# Patient Record
Sex: FEMALE | Race: WHITE | NOT HISPANIC OR LATINO | Employment: FULL TIME
[De-identification: names, ages, dates, MRNs, and addresses within clinical notes are randomized per-mention and may not be internally consistent; named-entity substitution may affect disease eponyms.]

---

## 2018-04-08 ENCOUNTER — HEALTH MAINTENANCE LETTER (OUTPATIENT)
Age: 23
End: 2018-04-08

## 2020-03-02 ENCOUNTER — HEALTH MAINTENANCE LETTER (OUTPATIENT)
Age: 25
End: 2020-03-02

## 2020-12-20 ENCOUNTER — HEALTH MAINTENANCE LETTER (OUTPATIENT)
Age: 25
End: 2020-12-20

## 2020-12-31 LAB
ABO (EXTERNAL): NORMAL
ABO (EXTERNAL): NORMAL
HEMATOCRIT (EXTERNAL): 36.5 % (ref 35–45)
HEMOGLOBIN (EXTERNAL): 12.5 G/DL (ref 11.7–15.5)
HEPATITIS B SURFACE ANTIGEN (EXTERNAL): NONREACTIVE
HIV1+2 AB SERPL QL IA: NONREACTIVE
PLATELET COUNT (EXTERNAL): 221 10E3/UL (ref 140–400)
RH (EXTERNAL): POSITIVE
RH (EXTERNAL): POSITIVE
RUBELLA ANTIBODY IGG (EXTERNAL): NONREACTIVE
RUBELLA ANTIBODY IGG (EXTERNAL): POSITIVE
VDRL (SYPHILIS) (EXTERNAL): NONREACTIVE

## 2021-04-18 ENCOUNTER — HEALTH MAINTENANCE LETTER (OUTPATIENT)
Age: 26
End: 2021-04-18

## 2021-06-22 ENCOUNTER — HOSPITAL ENCOUNTER (OUTPATIENT)
Facility: CLINIC | Age: 26
End: 2021-06-22
Payer: COMMERCIAL

## 2021-07-18 LAB — GROUP B STREPTOCOCCUS (EXTERNAL): NEGATIVE

## 2021-08-16 ENCOUNTER — HOSPITAL ENCOUNTER (INPATIENT)
Facility: HOSPITAL | Age: 26
LOS: 4 days | Discharge: HOME | End: 2021-08-20
Attending: ADVANCED PRACTICE MIDWIFE | Admitting: ADVANCED PRACTICE MIDWIFE
Payer: COMMERCIAL

## 2021-08-16 LAB
CYCLE THRESHOLD (CT): NORMAL
Lab: NORMAL
SARS-COV-2 RNA RESP QL NAA+PROBE: NEGATIVE

## 2021-08-16 PROCEDURE — 250N000013 HC RX MED GY IP 250 OP 250 PS 637: Performed by: ADVANCED PRACTICE MIDWIFE

## 2021-08-16 PROCEDURE — 87635 SARS-COV-2 COVID-19 AMP PRB: CPT | Performed by: ADVANCED PRACTICE MIDWIFE

## 2021-08-16 PROCEDURE — 120N000001 HC R&B MED SURG/OB

## 2021-08-16 RX ORDER — METOCLOPRAMIDE HYDROCHLORIDE 5 MG/ML
10 INJECTION INTRAMUSCULAR; INTRAVENOUS EVERY 6 HOURS PRN
Status: DISCONTINUED | OUTPATIENT
Start: 2021-08-16 | End: 2021-08-18 | Stop reason: HOSPADM

## 2021-08-16 RX ORDER — OXYTOCIN/0.9 % SODIUM CHLORIDE 30/500 ML
100-340 PLASTIC BAG, INJECTION (ML) INTRAVENOUS CONTINUOUS PRN
Status: DISCONTINUED | OUTPATIENT
Start: 2021-08-16 | End: 2021-08-20 | Stop reason: HOSPADM

## 2021-08-16 RX ORDER — MORPHINE SULFATE 10 MG/ML
10 INJECTION, SOLUTION INTRAMUSCULAR; INTRAVENOUS
Status: DISCONTINUED | OUTPATIENT
Start: 2021-08-16 | End: 2021-08-17

## 2021-08-16 RX ORDER — FENTANYL CITRATE 50 UG/ML
50-100 INJECTION, SOLUTION INTRAMUSCULAR; INTRAVENOUS
Status: DISCONTINUED | OUTPATIENT
Start: 2021-08-16 | End: 2021-08-18 | Stop reason: HOSPADM

## 2021-08-16 RX ORDER — CALCIUM CARBONATE 500 MG/1
1000 TABLET, CHEWABLE ORAL EVERY 4 HOURS PRN
Status: DISCONTINUED | OUTPATIENT
Start: 2021-08-16 | End: 2021-08-20 | Stop reason: HOSPADM

## 2021-08-16 RX ORDER — KETOROLAC TROMETHAMINE 30 MG/ML
30 INJECTION, SOLUTION INTRAMUSCULAR; INTRAVENOUS
Status: DISCONTINUED | OUTPATIENT
Start: 2021-08-16 | End: 2021-08-20 | Stop reason: HOSPADM

## 2021-08-16 RX ORDER — PROCHLORPERAZINE 25 MG
25 SUPPOSITORY, RECTAL RECTAL EVERY 12 HOURS PRN
Status: DISCONTINUED | OUTPATIENT
Start: 2021-08-16 | End: 2021-08-18 | Stop reason: HOSPADM

## 2021-08-16 RX ORDER — NALOXONE HYDROCHLORIDE 0.4 MG/ML
0.2 INJECTION, SOLUTION INTRAMUSCULAR; INTRAVENOUS; SUBCUTANEOUS
Status: DISCONTINUED | OUTPATIENT
Start: 2021-08-16 | End: 2021-08-18 | Stop reason: HOSPADM

## 2021-08-16 RX ORDER — OXYTOCIN/0.9 % SODIUM CHLORIDE 30/500 ML
340 PLASTIC BAG, INJECTION (ML) INTRAVENOUS CONTINUOUS PRN
Status: DISCONTINUED | OUTPATIENT
Start: 2021-08-16 | End: 2021-08-18 | Stop reason: HOSPADM

## 2021-08-16 RX ORDER — NALOXONE HYDROCHLORIDE 0.4 MG/ML
0.4 INJECTION, SOLUTION INTRAMUSCULAR; INTRAVENOUS; SUBCUTANEOUS
Status: DISCONTINUED | OUTPATIENT
Start: 2021-08-16 | End: 2021-08-18 | Stop reason: HOSPADM

## 2021-08-16 RX ORDER — ACETAMINOPHEN 325 MG/1
650 TABLET ORAL EVERY 4 HOURS PRN
Status: DISCONTINUED | OUTPATIENT
Start: 2021-08-16 | End: 2021-08-18 | Stop reason: HOSPADM

## 2021-08-16 RX ORDER — IBUPROFEN 600 MG/1
600 TABLET, FILM COATED ORAL
Status: DISCONTINUED | OUTPATIENT
Start: 2021-08-16 | End: 2021-08-20 | Stop reason: HOSPADM

## 2021-08-16 RX ORDER — ONDANSETRON 4 MG/1
4 TABLET, ORALLY DISINTEGRATING ORAL EVERY 6 HOURS PRN
Status: DISCONTINUED | OUTPATIENT
Start: 2021-08-16 | End: 2021-08-18 | Stop reason: HOSPADM

## 2021-08-16 RX ORDER — ONDANSETRON 2 MG/ML
4 INJECTION INTRAMUSCULAR; INTRAVENOUS EVERY 6 HOURS PRN
Status: DISCONTINUED | OUTPATIENT
Start: 2021-08-16 | End: 2021-08-18 | Stop reason: HOSPADM

## 2021-08-16 RX ORDER — CARBOPROST TROMETHAMINE 250 UG/ML
250 INJECTION, SOLUTION INTRAMUSCULAR
Status: DISCONTINUED | OUTPATIENT
Start: 2021-08-16 | End: 2021-08-18 | Stop reason: HOSPADM

## 2021-08-16 RX ORDER — MISOPROSTOL 200 UG/1
400 TABLET ORAL
Status: DISCONTINUED | OUTPATIENT
Start: 2021-08-16 | End: 2021-08-18 | Stop reason: HOSPADM

## 2021-08-16 RX ORDER — SODIUM CHLORIDE, SODIUM LACTATE, POTASSIUM CHLORIDE, CALCIUM CHLORIDE 600; 310; 30; 20 MG/100ML; MG/100ML; MG/100ML; MG/100ML
INJECTION, SOLUTION INTRAVENOUS CONTINUOUS
Status: DISCONTINUED | OUTPATIENT
Start: 2021-08-17 | End: 2021-08-18 | Stop reason: HOSPADM

## 2021-08-16 RX ORDER — MISOPROSTOL 200 UG/1
800 TABLET ORAL
Status: DISCONTINUED | OUTPATIENT
Start: 2021-08-16 | End: 2021-08-18 | Stop reason: HOSPADM

## 2021-08-16 RX ORDER — METHYLERGONOVINE MALEATE 0.2 MG/ML
200 INJECTION INTRAVENOUS
Status: DISCONTINUED | OUTPATIENT
Start: 2021-08-16 | End: 2021-08-18 | Stop reason: HOSPADM

## 2021-08-16 RX ORDER — OXYTOCIN 10 [USP'U]/ML
10 INJECTION, SOLUTION INTRAMUSCULAR; INTRAVENOUS
Status: DISCONTINUED | OUTPATIENT
Start: 2021-08-16 | End: 2021-08-20 | Stop reason: HOSPADM

## 2021-08-16 RX ORDER — LIDOCAINE 40 MG/G
CREAM TOPICAL
Status: DISCONTINUED | OUTPATIENT
Start: 2021-08-16 | End: 2021-08-16 | Stop reason: HOSPADM

## 2021-08-16 RX ORDER — METOCLOPRAMIDE 10 MG/1
10 TABLET ORAL EVERY 6 HOURS PRN
Status: DISCONTINUED | OUTPATIENT
Start: 2021-08-16 | End: 2021-08-18 | Stop reason: HOSPADM

## 2021-08-16 RX ORDER — PROCHLORPERAZINE MALEATE 10 MG
10 TABLET ORAL EVERY 6 HOURS PRN
Status: DISCONTINUED | OUTPATIENT
Start: 2021-08-16 | End: 2021-08-18 | Stop reason: HOSPADM

## 2021-08-16 RX ORDER — HYDROXYZINE HYDROCHLORIDE 50 MG/1
50 TABLET, FILM COATED ORAL
Status: DISCONTINUED | OUTPATIENT
Start: 2021-08-16 | End: 2021-08-17

## 2021-08-16 RX ORDER — OXYTOCIN 10 [USP'U]/ML
10 INJECTION, SOLUTION INTRAMUSCULAR; INTRAVENOUS
Status: DISCONTINUED | OUTPATIENT
Start: 2021-08-16 | End: 2021-08-18 | Stop reason: HOSPADM

## 2021-08-16 RX ORDER — PRENATAL VIT/IRON FUM/FOLIC AC 27MG-0.8MG
1 TABLET ORAL DAILY
COMMUNITY

## 2021-08-16 RX ADMIN — HYDROXYZINE HYDROCHLORIDE 50 MG: 50 TABLET, FILM COATED ORAL at 23:27

## 2021-08-16 RX ADMIN — HYDROXYZINE HYDROCHLORIDE 50 MG: 50 TABLET, FILM COATED ORAL at 21:05

## 2021-08-16 RX ADMIN — DINOPROSTONE 10 MG: 10 INSERT VAGINAL at 20:56

## 2021-08-16 RX ADMIN — CALCIUM CARBONATE (ANTACID) CHEW TAB 500 MG 1000 MG: 500 CHEW TAB at 23:22

## 2021-08-16 ASSESSMENT — MIFFLIN-ST. JEOR: SCORE: 1692.51

## 2021-08-17 LAB
ABO/RH(D): NORMAL
ANTIBODY SCREEN: NEGATIVE
HOLD SPECIMEN: NORMAL
SPECIMEN EXPIRATION DATE: NORMAL
T PALLIDUM AB SER QL: NEGATIVE

## 2021-08-17 PROCEDURE — 250N000011 HC RX IP 250 OP 636: Performed by: ADVANCED PRACTICE MIDWIFE

## 2021-08-17 PROCEDURE — 250N000013 HC RX MED GY IP 250 OP 250 PS 637: Performed by: ADVANCED PRACTICE MIDWIFE

## 2021-08-17 PROCEDURE — 36415 COLL VENOUS BLD VENIPUNCTURE: CPT | Performed by: ADVANCED PRACTICE MIDWIFE

## 2021-08-17 PROCEDURE — 86780 TREPONEMA PALLIDUM: CPT | Performed by: ADVANCED PRACTICE MIDWIFE

## 2021-08-17 PROCEDURE — 86901 BLOOD TYPING SEROLOGIC RH(D): CPT | Performed by: ADVANCED PRACTICE MIDWIFE

## 2021-08-17 PROCEDURE — 120N000001 HC R&B MED SURG/OB

## 2021-08-17 RX ORDER — HYDROXYZINE HYDROCHLORIDE 50 MG/1
100 TABLET, FILM COATED ORAL ONCE
Status: COMPLETED | OUTPATIENT
Start: 2021-08-17 | End: 2021-08-17

## 2021-08-17 RX ORDER — MISOPROSTOL 100 UG/1
25 TABLET ORAL
Status: DISCONTINUED | OUTPATIENT
Start: 2021-08-17 | End: 2021-08-18 | Stop reason: ALTCHOICE

## 2021-08-17 RX ORDER — MORPHINE SULFATE 10 MG/ML
15 INJECTION, SOLUTION INTRAMUSCULAR; INTRAVENOUS ONCE
Status: COMPLETED | OUTPATIENT
Start: 2021-08-17 | End: 2021-08-17

## 2021-08-17 RX ORDER — HYDROXYZINE HYDROCHLORIDE 50 MG/1
50 TABLET, FILM COATED ORAL
Status: DISCONTINUED | OUTPATIENT
Start: 2021-08-17 | End: 2021-08-18 | Stop reason: HOSPADM

## 2021-08-17 RX ORDER — HYDROXYZINE HYDROCHLORIDE 50 MG/1
50 TABLET, FILM COATED ORAL ONCE
Status: COMPLETED | OUTPATIENT
Start: 2021-08-17 | End: 2021-08-17

## 2021-08-17 RX ORDER — MORPHINE SULFATE 10 MG/ML
10 INJECTION, SOLUTION INTRAMUSCULAR; INTRAVENOUS
Status: DISCONTINUED | OUTPATIENT
Start: 2021-08-17 | End: 2021-08-18 | Stop reason: ALTCHOICE

## 2021-08-17 RX ORDER — LEVOTHYROXINE SODIUM 25 UG/1
75 TABLET ORAL
Status: DISCONTINUED | OUTPATIENT
Start: 2021-08-17 | End: 2021-08-17 | Stop reason: CLARIF

## 2021-08-17 RX ADMIN — MISOPROSTOL 25 MCG: 100 TABLET ORAL at 22:15

## 2021-08-17 RX ADMIN — MISOPROSTOL 25 MCG: 100 TABLET ORAL at 16:00

## 2021-08-17 RX ADMIN — MISOPROSTOL 25 MCG: 100 TABLET ORAL at 12:03

## 2021-08-17 RX ADMIN — MISOPROSTOL 25 MCG: 100 TABLET ORAL at 10:01

## 2021-08-17 RX ADMIN — MISOPROSTOL 25 MCG: 100 TABLET ORAL at 14:04

## 2021-08-17 RX ADMIN — HYDROXYZINE HYDROCHLORIDE 100 MG: 50 TABLET, FILM COATED ORAL at 22:40

## 2021-08-17 RX ADMIN — MORPHINE SULFATE 15 MG: 10 INJECTION INTRAVENOUS at 22:41

## 2021-08-17 RX ADMIN — MISOPROSTOL 25 MCG: 100 TABLET ORAL at 18:07

## 2021-08-17 RX ADMIN — MISOPROSTOL 25 MCG: 100 TABLET ORAL at 20:15

## 2021-08-17 RX ADMIN — CALCIUM CARBONATE (ANTACID) CHEW TAB 500 MG 1000 MG: 500 CHEW TAB at 18:43

## 2021-08-17 NOTE — PROGRESS NOTES
"S- still comfortable. Resting in bed.  FOB is also resting on the couch. Still notes very mild cramping. Active FM  O- ./66   Pulse 78   Temp 98.2  F (36.8  C) (Oral)   Resp 16   Ht 1.651 m (5' 5\")   Wt 96.2 kg (212 lb)   Breastfeeding Yes   BMI 35.28 kg/m     Ctx difficult to trace  Cat 1-2 tracing. RN having difficulty monitoring; possible decelerations occurring.  SVE 2/80/-2 AROM for clear fluid  IUPC and FSE placed  A- IUP 41 1/7 s/p 12 hours cervidil and 3 doses cytotec  Possible decelerations  SURESH 7  P- Continue to monitor EFM closely and intervene as needed  Continue po cytotec until 12 doses have been completed or labor ensues  Continue previous plan of care    "

## 2021-08-17 NOTE — PROGRESS NOTES
"S- Comfortable. Sitting up finishing breakfast. Active FM. Denies LOF or bleeding. Occ mild \"period cramps\"  FOB at bedside  O- ./66   Pulse 78   Temp 98.2  F (36.8  C) (Oral)   Resp 16   Ht 1.651 m (5' 5\")   Wt 96.2 kg (212 lb)   BMI 35.28 kg/m     Cat 1 tracing  Ctx occ, mild  Cervix 1-2/50/0 per RN  A- 41 1/7 IUP IOL secondary to post dates; s/p 12 hours cervidil  P- po cytotec per protocol  Encourage ambulation and position change  Pitocin/AROM when able  Dr Jj aware of pt status    "

## 2021-08-17 NOTE — PLAN OF CARE
Problem: Change in Fetal Wellbeing (Labor)  Goal: Stable Fetal Wellbeing  Outcome: Improving  Intervention: Promote and Monitor Fetal Wellbeing  Recent Flowsheet Documentation  Taken 8/17/2021 0774 by Lakeshia Abraham RN  Body Position: position changed independently    Reactive NST.     Problem: Adult Inpatient Plan of Care  Goal: Plan of Care Review  Outcome: Improving  Flowsheets (Taken 8/17/2021 0754)  Plan of Care Reviewed With: patient  Progress: improving   Patient here for induction of labor. Cervidil placed last evening 08/16/2021 at 2056. Plan to remove Cervidil at 0900. Nursing to perform SVE, and update provider for today's plan of care.

## 2021-08-17 NOTE — PLAN OF CARE
Problem: Adult Inpatient Plan of Care  Goal: Plan of Care Review  Outcome: Improving    Pt will be able to verbalize the plan of care.  Plan of care discussed with the patient.  Pt able to verbalize the plan of care.

## 2021-08-17 NOTE — H&P
[facility] Labor and Delivery History and Physical    Velma Wright MRN# [id number]   Age: 26 year old YOB: 1995     Date of Admission:  2021         Chief Complaint:   Velma Wright is a 26 year old female who is 41w0d pregnant and being admitted for induction of labor, indication post-dates. BECKY 21 per LMP. Pt denies any contraction or LOF. Positive fetal movement. BPP  today in clinic.           Pregnancy history:     OBSTETRIC HISTORY:    OB History    Para Term  AB Living   1 0 0 0 0 0   SAB TAB Ectopic Multiple Live Births   0 0 0 0 0      # Outcome Date GA Lbr Sam/2nd Weight Sex Delivery Anes PTL Lv   1 Current              Prenatal Complications:  1) Placental lakes on FAS and at 24 wks- resolved.   2) Polyhydramnios (LAZARUS at 26) noted at 33 wk US, resolved at 37 wks.     Prenatal Labs:   Lab Results   Component Value Date    CHPCRT  2016     Negative   Negative for C. trachomatis rRNA by transcription mediated amplification.   A negative result by transcription mediated amplification does not preclude the   presence of C. trachomatis infection because results are dependent on proper   and adequate collection, absence of inhibitors, and sufficient rRNA to be   detected.      GCPCRT  2016     Negative   Negative for N. gonorrhoeae rRNA by transcription mediated amplification.   A negative result by transcription mediated amplification does not preclude the   presence of N. gonorrhoeae infection because results are dependent on proper   and adequate collection, absence of inhibitors, and sufficient rRNA to be   detected.      HGB 13.7 2016       GBS Status: Negative    Active Problem List  Patient Active Problem List   Diagnosis     CARDIOVASCULAR SCREENING; LDL GOAL LESS THAN 160     Routine screening for STI (sexually transmitted infection)     UTI (urinary tract infection)     Protein in urine     Encounter for triage in  "pregnant patient       Medication Prior to Admission  Medications Prior to Admission   Medication Sig Dispense Refill Last Dose     Prenatal Vit-Fe Fumarate-FA (PRENATAL MULTIVITAMIN W/IRON) 27-0.8 MG tablet Take 1 tablet by mouth daily   8/16/2021 at Unknown time     naproxen (NAPROSYN) 500 MG tablet Take 1 tablet (500 mg) by mouth 2 times daily (with meals) 180 tablet 1 More than a month at Unknown time     norgestim-eth estrad triphasic (TRINESSA, 28,) 0.18/0.215/0.25 MG-35 MCG per tablet Take 1 tablet by mouth daily 84 tablet 3 More than a month at Unknown time   .        Maternal Past Medical History:     Past Medical History:   Diagnosis Date     Contraception                        Family History:     Family History   Problem Relation Age of Onset     Family History Negative Mother      Family History Negative Father      Diabetes Paternal Grandmother      Diabetes Paternal Grandfather      Family History Negative Brother      Family History Negative Brother      Hypothyroidism Maternal Aunt      Hypothyroidism Cousin      Family history reviewed          Social History:     Social History     Tobacco Use     Smoking status: Former Smoker     Types: Cigarettes     Smokeless tobacco: Never Used     Tobacco comment: once per week   Substance Use Topics     Alcohol use: Not Currently     Comment: occasionally- once per week            Review of Systems:   The Review of Systems is negative other than noted in the HPI          Physical Exam:     Vitals were reviewed  Patient Vitals for the past 8 hrs:   Temp Temp src Resp Height Weight   08/16/21 1926 98.5  F (36.9  C) Oral 18 1.651 m (5' 5\") 96.2 kg (212 lb)     Constitutional:   awake, alert, cooperative, no apparent distress, and appears stated age     Lungs:   No increased work of breathing, good air exchange, clear to auscultation bilaterally, no crackles or wheezing     Cardiovascular:   Normal apical impulse, regular rate and rhythm, normal S1 and S2, no S3 " or S4, and no murmur noted     Skin:   normal skin color, texture, turgor      Cervix: Per RN exam on admit   Membranes: intact   Dilation: 1   Effacement: 50%   Station:-1   Consistency: average   Position: Posterior  Presentation:Cephalic  Fetal Heart Rate Tracing: Baseline 130 moderate variability, + accels, no decels.  Tocometer: Q 2-6 minutes, mild. Soft.                        Assessment:   Velma Wright is a 41w0d pregnant female admitted with induction of labor, indication post-dates.  GBS negative  Fetal tracing category I  Donis score at 5        Plan:   Admit - see IP orders  Discussed R/B/A of cervical ripening with Cervidil, pt agreeable with plan.  Continuous fetal monitoring  Therapeutic sleep PRN  Anticipate     Dr. Doran aware of pt status and remains available for consultation/ collaboration PRN.     Caryn Fountain, JOSE DANIEL TAY

## 2021-08-17 NOTE — PROGRESS NOTES
Removed Cervidil per order. SVE 1-2/50/0 posterior/intact. CNM updated, plan for PO Cytotec for continued cervical ripening.Michelle unable to place orders, Vikki WHALEN CNM independent, she will put in orders. Will proceed when new orders for additional cervical ripening.

## 2021-08-17 NOTE — PROGRESS NOTES
Orders placed for PO Cytotec, first dose administered. Patient is agreeable with plan. She would also be open to AROM when appropriate.

## 2021-08-18 ENCOUNTER — ANESTHESIA EVENT (OUTPATIENT)
Dept: OBGYN | Facility: HOSPITAL | Age: 26
End: 2021-08-18
Payer: COMMERCIAL

## 2021-08-18 ENCOUNTER — ANESTHESIA (OUTPATIENT)
Dept: OBGYN | Facility: HOSPITAL | Age: 26
End: 2021-08-18
Payer: COMMERCIAL

## 2021-08-18 LAB
ERYTHROCYTE [DISTWIDTH] IN BLOOD BY AUTOMATED COUNT: 14.1 % (ref 10–15)
HCT VFR BLD AUTO: 39.1 % (ref 35–47)
HGB BLD-MCNC: 12.9 G/DL (ref 11.7–15.7)
MCH RBC QN AUTO: 31.7 PG (ref 26.5–33)
MCHC RBC AUTO-ENTMCNC: 33 G/DL (ref 31.5–36.5)
MCV RBC AUTO: 96 FL (ref 78–100)
PLATELET # BLD AUTO: 166 10E3/UL (ref 150–450)
RBC # BLD AUTO: 4.07 10E6/UL (ref 3.8–5.2)
WBC # BLD AUTO: 23.5 10E3/UL (ref 4–11)

## 2021-08-18 PROCEDURE — 3E033VJ INTRODUCTION OF OTHER HORMONE INTO PERIPHERAL VEIN, PERCUTANEOUS APPROACH: ICD-10-PCS | Performed by: MIDWIFE

## 2021-08-18 PROCEDURE — 250N000009 HC RX 250: Performed by: ANESTHESIOLOGY

## 2021-08-18 PROCEDURE — 258N000003 HC RX IP 258 OP 636: Performed by: ADVANCED PRACTICE MIDWIFE

## 2021-08-18 PROCEDURE — 36415 COLL VENOUS BLD VENIPUNCTURE: CPT | Performed by: MIDWIFE

## 2021-08-18 PROCEDURE — 250N000009 HC RX 250: Performed by: ADVANCED PRACTICE MIDWIFE

## 2021-08-18 PROCEDURE — 250N000013 HC RX MED GY IP 250 OP 250 PS 637: Performed by: ADVANCED PRACTICE MIDWIFE

## 2021-08-18 PROCEDURE — 0HQ9XZZ REPAIR PERINEUM SKIN, EXTERNAL APPROACH: ICD-10-PCS | Performed by: OBSTETRICS & GYNECOLOGY

## 2021-08-18 PROCEDURE — 250N000011 HC RX IP 250 OP 636: Performed by: ANESTHESIOLOGY

## 2021-08-18 PROCEDURE — 722N000001 HC LABOR CARE VAGINAL DELIVERY SINGLE

## 2021-08-18 PROCEDURE — 3E0P7VZ INTRODUCTION OF HORMONE INTO FEMALE REPRODUCTIVE, VIA NATURAL OR ARTIFICIAL OPENING: ICD-10-PCS | Performed by: ADVANCED PRACTICE MIDWIFE

## 2021-08-18 PROCEDURE — 10907ZC DRAINAGE OF AMNIOTIC FLUID, THERAPEUTIC FROM PRODUCTS OF CONCEPTION, VIA NATURAL OR ARTIFICIAL OPENING: ICD-10-PCS | Performed by: OBSTETRICS & GYNECOLOGY

## 2021-08-18 PROCEDURE — 3E0R3BZ INTRODUCTION OF ANESTHETIC AGENT INTO SPINAL CANAL, PERCUTANEOUS APPROACH: ICD-10-PCS | Performed by: ANESTHESIOLOGY

## 2021-08-18 PROCEDURE — 88307 TISSUE EXAM BY PATHOLOGIST: CPT | Mod: TC | Performed by: OBSTETRICS & GYNECOLOGY

## 2021-08-18 PROCEDURE — 00HU33Z INSERTION OF INFUSION DEVICE INTO SPINAL CANAL, PERCUTANEOUS APPROACH: ICD-10-PCS | Performed by: ANESTHESIOLOGY

## 2021-08-18 PROCEDURE — 250N000013 HC RX MED GY IP 250 OP 250 PS 637: Performed by: OBSTETRICS & GYNECOLOGY

## 2021-08-18 PROCEDURE — 85027 COMPLETE CBC AUTOMATED: CPT | Performed by: MIDWIFE

## 2021-08-18 PROCEDURE — 250N000011 HC RX IP 250 OP 636: Performed by: ADVANCED PRACTICE MIDWIFE

## 2021-08-18 PROCEDURE — 370N000003 HC ANESTHESIA WARD SERVICE

## 2021-08-18 PROCEDURE — 120N000001 HC R&B MED SURG/OB

## 2021-08-18 RX ORDER — BUPIVACAINE HYDROCHLORIDE 2.5 MG/ML
INJECTION, SOLUTION EPIDURAL; INFILTRATION; INTRACAUDAL
Status: COMPLETED | OUTPATIENT
Start: 2021-08-18 | End: 2021-08-18

## 2021-08-18 RX ORDER — OXYTOCIN/0.9 % SODIUM CHLORIDE 30/500 ML
340 PLASTIC BAG, INJECTION (ML) INTRAVENOUS CONTINUOUS PRN
Status: DISCONTINUED | OUTPATIENT
Start: 2021-08-18 | End: 2021-08-20 | Stop reason: HOSPADM

## 2021-08-18 RX ORDER — NALBUPHINE HYDROCHLORIDE 10 MG/ML
2.5-5 INJECTION, SOLUTION INTRAMUSCULAR; INTRAVENOUS; SUBCUTANEOUS EVERY 6 HOURS PRN
Status: DISCONTINUED | OUTPATIENT
Start: 2021-08-18 | End: 2021-08-20 | Stop reason: HOSPADM

## 2021-08-18 RX ORDER — METHYLERGONOVINE MALEATE 0.2 MG/ML
200 INJECTION INTRAVENOUS
Status: DISCONTINUED | OUTPATIENT
Start: 2021-08-18 | End: 2021-08-20 | Stop reason: HOSPADM

## 2021-08-18 RX ORDER — OXYTOCIN 10 [USP'U]/ML
INJECTION, SOLUTION INTRAMUSCULAR; INTRAVENOUS
Status: DISCONTINUED
Start: 2021-08-18 | End: 2021-08-18 | Stop reason: WASHOUT

## 2021-08-18 RX ORDER — MISOPROSTOL 200 UG/1
800 TABLET ORAL
Status: DISCONTINUED | OUTPATIENT
Start: 2021-08-18 | End: 2021-08-20 | Stop reason: HOSPADM

## 2021-08-18 RX ORDER — OXYCODONE HYDROCHLORIDE 5 MG/1
5 TABLET ORAL EVERY 4 HOURS PRN
Status: DISCONTINUED | OUTPATIENT
Start: 2021-08-18 | End: 2021-08-20 | Stop reason: HOSPADM

## 2021-08-18 RX ORDER — NALOXONE HYDROCHLORIDE 0.4 MG/ML
0.2 INJECTION, SOLUTION INTRAMUSCULAR; INTRAVENOUS; SUBCUTANEOUS
Status: DISCONTINUED | OUTPATIENT
Start: 2021-08-18 | End: 2021-08-20 | Stop reason: HOSPADM

## 2021-08-18 RX ORDER — LIDOCAINE 40 MG/G
CREAM TOPICAL
Status: DISCONTINUED | OUTPATIENT
Start: 2021-08-18 | End: 2021-08-18 | Stop reason: HOSPADM

## 2021-08-18 RX ORDER — CARBOPROST TROMETHAMINE 250 UG/ML
250 INJECTION, SOLUTION INTRAMUSCULAR
Status: DISCONTINUED | OUTPATIENT
Start: 2021-08-18 | End: 2021-08-20 | Stop reason: HOSPADM

## 2021-08-18 RX ORDER — ACETAMINOPHEN 325 MG/1
650 TABLET ORAL EVERY 4 HOURS PRN
Status: DISCONTINUED | OUTPATIENT
Start: 2021-08-18 | End: 2021-08-20 | Stop reason: HOSPADM

## 2021-08-18 RX ORDER — NALOXONE HYDROCHLORIDE 0.4 MG/ML
0.4 INJECTION, SOLUTION INTRAMUSCULAR; INTRAVENOUS; SUBCUTANEOUS
Status: DISCONTINUED | OUTPATIENT
Start: 2021-08-18 | End: 2021-08-20 | Stop reason: HOSPADM

## 2021-08-18 RX ORDER — OXYTOCIN 10 [USP'U]/ML
10 INJECTION, SOLUTION INTRAMUSCULAR; INTRAVENOUS
Status: DISCONTINUED | OUTPATIENT
Start: 2021-08-18 | End: 2021-08-20 | Stop reason: HOSPADM

## 2021-08-18 RX ORDER — BISACODYL 10 MG
10 SUPPOSITORY, RECTAL RECTAL DAILY PRN
Status: DISCONTINUED | OUTPATIENT
Start: 2021-08-18 | End: 2021-08-20 | Stop reason: HOSPADM

## 2021-08-18 RX ORDER — OXYTOCIN/0.9 % SODIUM CHLORIDE 30/500 ML
1-24 PLASTIC BAG, INJECTION (ML) INTRAVENOUS CONTINUOUS
Status: DISCONTINUED | OUTPATIENT
Start: 2021-08-18 | End: 2021-08-18 | Stop reason: HOSPADM

## 2021-08-18 RX ORDER — HYDROCORTISONE 2.5 %
CREAM (GRAM) TOPICAL 3 TIMES DAILY PRN
Status: DISCONTINUED | OUTPATIENT
Start: 2021-08-18 | End: 2021-08-20 | Stop reason: HOSPADM

## 2021-08-18 RX ORDER — MODIFIED LANOLIN
OINTMENT (GRAM) TOPICAL
Status: DISCONTINUED | OUTPATIENT
Start: 2021-08-18 | End: 2021-08-20 | Stop reason: HOSPADM

## 2021-08-18 RX ORDER — MISOPROSTOL 200 UG/1
400 TABLET ORAL
Status: DISCONTINUED | OUTPATIENT
Start: 2021-08-18 | End: 2021-08-20 | Stop reason: HOSPADM

## 2021-08-18 RX ORDER — IBUPROFEN 800 MG/1
800 TABLET, FILM COATED ORAL EVERY 6 HOURS PRN
Status: DISCONTINUED | OUTPATIENT
Start: 2021-08-18 | End: 2021-08-20 | Stop reason: HOSPADM

## 2021-08-18 RX ORDER — LIDOCAINE HYDROCHLORIDE 10 MG/ML
INJECTION, SOLUTION EPIDURAL; INFILTRATION; INTRACAUDAL; PERINEURAL
Status: DISCONTINUED
Start: 2021-08-18 | End: 2021-08-18 | Stop reason: WASHOUT

## 2021-08-18 RX ORDER — MISOPROSTOL 200 UG/1
TABLET ORAL
Status: DISCONTINUED
Start: 2021-08-18 | End: 2021-08-18 | Stop reason: WASHOUT

## 2021-08-18 RX ORDER — DOCUSATE SODIUM 100 MG/1
100 CAPSULE, LIQUID FILLED ORAL DAILY
Status: DISCONTINUED | OUTPATIENT
Start: 2021-08-18 | End: 2021-08-20 | Stop reason: HOSPADM

## 2021-08-18 RX ORDER — LIDOCAINE HYDROCHLORIDE 20 MG/ML
SOLUTION OROPHARYNGEAL
Status: DISCONTINUED
Start: 2021-08-18 | End: 2021-08-18 | Stop reason: WASHOUT

## 2021-08-18 RX ORDER — EPHEDRINE SULFATE 50 MG/ML
5 INJECTION, SOLUTION INTRAMUSCULAR; INTRAVENOUS; SUBCUTANEOUS
Status: DISCONTINUED | OUTPATIENT
Start: 2021-08-18 | End: 2021-08-18 | Stop reason: HOSPADM

## 2021-08-18 RX ADMIN — CALCIUM CARBONATE (ANTACID) CHEW TAB 500 MG 1000 MG: 500 CHEW TAB at 14:31

## 2021-08-18 RX ADMIN — SODIUM CHLORIDE, POTASSIUM CHLORIDE, SODIUM LACTATE AND CALCIUM CHLORIDE 1000 ML: 600; 310; 30; 20 INJECTION, SOLUTION INTRAVENOUS at 03:55

## 2021-08-18 RX ADMIN — Medication: at 12:34

## 2021-08-18 RX ADMIN — Medication: at 04:47

## 2021-08-18 RX ADMIN — MISOPROSTOL 25 MCG: 100 TABLET ORAL at 00:16

## 2021-08-18 RX ADMIN — Medication 5 MG: at 07:57

## 2021-08-18 RX ADMIN — KETOROLAC TROMETHAMINE 30 MG: 30 INJECTION, SOLUTION INTRAMUSCULAR; INTRAVENOUS at 17:27

## 2021-08-18 RX ADMIN — BUPIVACAINE HYDROCHLORIDE 5 ML: 2.5 INJECTION, SOLUTION EPIDURAL; INFILTRATION; INTRACAUDAL at 04:35

## 2021-08-18 RX ADMIN — Medication 2 MILLI-UNITS/MIN: at 05:23

## 2021-08-18 RX ADMIN — WITCH HAZEL: 500 SOLUTION RECTAL; TOPICAL at 21:50

## 2021-08-18 RX ADMIN — Medication: at 21:50

## 2021-08-18 RX ADMIN — SODIUM CHLORIDE, POTASSIUM CHLORIDE, SODIUM LACTATE AND CALCIUM CHLORIDE 125 ML/HR: 600; 310; 30; 20 INJECTION, SOLUTION INTRAVENOUS at 08:02

## 2021-08-18 NOTE — PROGRESS NOTES
Pt feeling cxns to low abdomen. No bloody show present. CAT I tracing.   Updated Michelle Dueñas CNM of pt status and pt desire for sleep. CNM does not want pt to have an epidural at this time. Ordered sleep meds and to continue with Cytotec. No sve needed at this time.Candi Arenas RN

## 2021-08-18 NOTE — PROGRESS NOTES
Sleep meds given with instructions to call RN if she cannot sleep, or if she wakes with stronger cxns. Discussed sve at that time as needed. Pt and  verbalized understanding and settled in for sleep. .Candi Arenas RN

## 2021-08-18 NOTE — PLAN OF CARE
Problem: Labor Pain (Labor)  Goal: Acceptable Pain Control  Outcome: Improving     Problem: Infection (Labor)  Goal: Absence of Infection Signs and Symptoms  Outcome: Improving     Problem: Change in Fetal Wellbeing (Labor)  Goal: Stable Fetal Wellbeing  Outcome: Improving    EFM assessed continuously. Currently Pt is afebrile, FHR WNL, and progressing in labor with pain controlled adequately by labor epidural.    Karen J Schoenberg, RN

## 2021-08-18 NOTE — ANESTHESIA PREPROCEDURE EVALUATION
Anesthesia Pre-Procedure Evaluation    Patient: Velma Wright   MRN: [id number] : 1995        Preoperative Diagnosis: * No surgery found *   Procedure :      Past Medical History:   Diagnosis Date     Contraception       Past Surgical History:   Procedure Laterality Date     DENTAL SURGERY  2013    [place] tooth extraction      Allergies   Allergen Reactions     Bactrim [Sulfamethoxazole W-Trimethoprim] Rash     fever     Cephalexin Rash     Pcn [Penicillins]      Rash      Social History     Tobacco Use     Smoking status: Former Smoker     Types: Cigarettes     Smokeless tobacco: Never Used     Tobacco comment: once per week   Substance Use Topics     Alcohol use: Not Currently     Comment: occasionally- once per week      Wt Readings from Last 1 Encounters:   21 96.2 kg (212 lb)        Anesthesia Evaluation   Pt has not had prior anesthetic         ROS/MED HX  ENT/Pulmonary:  - neg pulmonary ROS  (-) asthma   Neurologic:  - neg neurologic ROS     Cardiovascular:  - neg cardiovascular ROS  (-) PIH   METS/Exercise Tolerance:     Hematologic:  - neg hematologic  ROS     Musculoskeletal:       GI/Hepatic:  - neg GI/hepatic ROS     Renal/Genitourinary:       Endo:     (+) Obesity,     Psychiatric/Substance Use:  - neg psychiatric ROS     Infectious Disease:       Malignancy:       Other:            Physical Exam    Airway        Mallampati: II       Respiratory Devices and Support         Dental           Cardiovascular             Pulmonary                   OUTSIDE LABS:  CBC:   Lab Results   Component Value Date    WBC 5.8 2016    HGB 13.7 2016    HCT 40.9 2016     2016     BMP:   Lab Results   Component Value Date     2016     2013    POTASSIUM 4.2 2016    POTASSIUM 4.3 2013    CHLORIDE 106 2016    CHLORIDE 102 2013    CO2 24 2016    CO2 25 2013    BUN 11 2016    BUN 11 2013    CR 0.75  04/27/2016    CR 0.70 04/04/2013    GLC 84 04/27/2016    GLC 84 04/04/2013     COAGS: No results found for: PTT, INR, FIBR  POC:   Lab Results   Component Value Date    HCG Negative 09/23/2013     HEPATIC:   Lab Results   Component Value Date    ALBUMIN 3.9 04/27/2016    PROTTOTAL 7.1 04/27/2016    ALT 15 04/27/2016    AST 16 04/27/2016    ALKPHOS 68 04/27/2016    BILITOTAL 0.4 04/27/2016     OTHER:   Lab Results   Component Value Date    TEODORO 9.0 04/27/2016    TSH 1.10 04/27/2016    T4 0.90 04/27/2016       Anesthesia Plan    ASA Status:  2      Anesthesia Type: Epidural.              Consents    Anesthesia Plan(s) and associated risks, benefits, and realistic alternatives discussed. Questions answered and patient/representative(s) expressed understanding.     - Discussed with:  Patient         Postoperative Care            Comments:    Patient requests labor epidural. Chart reviewed, including labs. Reviewed options and risks with the patient, including but not limited to: bleeding, infection, damage to tissues under the skin (nerves, muscles, blood vessels), hypotension, headache, and epidural failure. Questions answered, consent signed. Patient agrees to elective labor epidural.         neg OB ROS.       Malia Stanley MD

## 2021-08-18 NOTE — PROVIDER NOTIFICATION
JASVIR Brown phones for update. Notified of recent VE after straight cath,, VS with low grade temp, and  EFM data with periods of minimal variability and occasional lates that occur with certain left side lying. Pt is continued to be repositioned for epidural effectiveness. PCEA doses cover her statements of occasional LLQ discomfort.     CNM states she is at [facility] for a delivery and will be available as needed. Instructs to have pt labor down if possible.     Karen J Schoenberg, RN

## 2021-08-18 NOTE — PLAN OF CARE
Problem: Adult Inpatient Plan of Care  Goal: Patient-Specific Goal (Individualized)  8/18/2021 0405 by Candi Arenas, RN  Outcome: Improving    Problem: Change in Fetal Wellbeing (Labor)  Goal: Stable Fetal Wellbeing  8/18/2021 0405 by Candi Arenas, RN  Outcome: Improving    Problem: Infection (Labor)  Goal: Absence of Infection Signs and Symptoms  8/18/2021 0405 by Candi Arenas, RN  Outcome: Improving       Pt able to sleep after morphine and vistaril given. Cxns closer now and pt wanting an epidural. POC discussed for after epidural.   CAT I tracing with moderate variability. Occasional variable and early.Candi Arenas, RN

## 2021-08-18 NOTE — PROGRESS NOTES
Pt feeling more of the cxns. States able to rest with cxns at this time. Wanted sve. Cervix 3/70/-2. Soft and posterior.   Discussed holding recent dose of cytotec and may give give a future dose as needed. Pt verbalized understanding and agreement. Turned to right lateral.Candi Arenas RN

## 2021-08-18 NOTE — PROGRESS NOTES
Pt repositioned. IUPC flushed as contraction strength doesn't show greater than 25mmhg.   Pt temp now 100.1 po. Heavier bed cover removed. IV LR rate increased to 200 cc/hr. Plan of are discussed with pt and spouse.     Karen J Schoenberg, RN

## 2021-08-18 NOTE — PROGRESS NOTES
Pt asleep. Aroused fo indicate plan of care for catheterization and repositioning.   CNM on [phone. RN steps out to update her or plan. CNm agreeable and states she will round on pt in 1.5-2 hours, call prn.     Pt with supportive spouse at bedside.   Pt VS and assessments completed. Repositioned onto back for straight cath. 700 ml sylvia urine removed. Ve done. Progress noted and pt encouraged.   Repositioned to right running man position with peanut ball.     Montivideo calculated to 120. Pitocin increased per protocol.     Karen J Schoenberg, RN

## 2021-08-18 NOTE — L&D DELIVERY NOTE
Vaginal Delivery Note    Name: Velma Wright  : 1995  MRN: [id number]    PRE DELIVERY DIAGNOSIS  1) 26 year old  1 Para 0 at 41w2d          POST DELIVERY DIAGNOSIS  1) 26 year old  @ 41w2d  2) Delivery of a viable infant weighing 3840 g via Vaginal, Spontaneous       Augmentation yes               Indication: Post-term Gestation  Induction yes                       Indication: Ineffective Contraction Pattern    Monitors: FSE, IUPC     GBS: Negative    ROM: AROM  Fluid Type: Clear    Labor Analgesia/Anesthesia: Epidural    Cord pH obtained: No  Placenta submitted to Pathology: Yes    Description of procedure:   26 year old  with [facility] and pregnancy complicated by post-term gestation presented to L&D for induction .  Her hospital course was uncomplicated.  Patient progressed to complete with pitocin augmentation.    Shoulder Dystocia No  Operative Vaginal Delivery No  Infant spontaneously delivered over a first degree perineal laceration.   It was repaired in the normal fashion using epidural using 3-0 Vicryl.  Infant delivered in OA position.  Nuchal cord YES, loose, delivered through    Placenta spontaneously delivered: 2021  3:28 PM  grossly intact with 3 vessel cord.  Infant:  Live, vigarous female infant was handed to mom. Apgars 8 and 9 at one minute and five minutes.   Delivery was complicated by None . Interventions included none.    Delivery QBL (mL): 800    Mother and Infant stable.    Elias Mcmahon MD    2021 4:11 PM

## 2021-08-18 NOTE — PROGRESS NOTES
Dr. Mcmahon called to bedside for eval of possible vacuum. Complete/+1 to +2 station. FHR tachycardia and variable decels with ctx's/pushing down to 100s, moderate variability noted.

## 2021-08-18 NOTE — PROGRESS NOTES
Michelle Dueñas,INDRAM notified of pt stating cxns are stronger. Informed CNM of recent sve and last Cytotec given at 0015 d/t earlies present. Orders obtained that pt can have an epidural and then start pitocin if no cervical change.   Pt and  notified of orders by cnm and are in agreement. LR bolus started.Candi Arenas RN

## 2021-08-18 NOTE — PROGRESS NOTES
Epidural placed by Dr. Stanley. Pt positioned to left lateral with BP cuff on right arm. Discussed beltran vs straight cath with Dr. Stanley. Will straight cath PRN and if beltran would be beneficial, will contact  OB provider.   sve done and cervix was 3.5/80/-2, soft and posterior. Dark brown blood noted to pad. ernesto start low dose Pitocin per orders.   Assess IUPC and inserted to correct level.   Pt still feeling cxn after 10min . Reassured pt that epidural is setting up. Discussed gaol of the epidural is to decrease pain but to still be able to push. .Candi Arenas RN

## 2021-08-18 NOTE — ANESTHESIA PROCEDURE NOTES
Epidural catheter Procedure Note  Pre-Procedure   Staff -        Anesthesiologist:  Malia Her MD       Performed By: anesthesiologist       Location: OB       Procedure Start/Stop Times: 8/18/2021 4:34 AM and 8/18/2021 4:45 AM       Pre-Anesthestic Checklist: patient identified, IV checked, risks and benefits discussed, informed consent, monitors and equipment checked, pre-op evaluation, at physician/surgeon's request and post-op pain management  Timeout:       Correct Patient: Yes        Correct Procedure: Yes        Correct Site: Yes        Correct Position: Yes   Procedure Documentation  Procedure: epidural catheter       Patient Position: sitting       Skin prep: Chloraprep      Local skin infiltrated with mL of 2% lidocaine.        Insertion Site: T7-8, L3-4. (midline approach).       Technique: LORT saline        ANJELICA at 4 cm.       Needle Type: Zynstra needle       Needle Gauge: 17.        Needle Length (Inches): 3.5        Catheter: 18 G.         Catheter threaded easily.         5 cm epidural space.         Threaded 9 cm at skin.         # of attempts: 1 and  # of redirects:  0    Assessment/Narrative         Paresthesias: No.       Test dose of 6 (In two divided doses of 3cc, spaced by 3 min) mL lidocaine 1.5% w/ 1:200,000 epinephrine at 04:32 CDT.         Test dose negative, 3 minutes after injection, for signs of intravascular, subdural, or intrathecal injection.       Insertion/Infusion Method: LORT saline       No aspiration negative for Heme or CSF via Epidural Catheter.       Sensory Level Left: T10.       Sensory Level Right: T10.    Medication(s) Administered   0.25% Bupivacaine PF (Epidural), 5 mL  Medication Administration Time: 8/18/2021 4:35 AM

## 2021-08-18 NOTE — PROGRESS NOTES
Pt comfortable and not feeling cxns. Discussed plan for pitocin titration as well as not to get out of bed and will monitor bladder for need for staright cathing.Candi Arenas RN

## 2021-08-18 NOTE — PROGRESS NOTES
Infant delivery at 1502 with Nuccal cord x 1.   Placenta delivery at 1528.   Perineal repair by Dr. Mcmahon.    Karen J Schoenberg, RN

## 2021-08-18 NOTE — PROGRESS NOTES
0215 dose of Cytotec not given at this time, due to minimal variability and early decels noted. .Candi Arenas RN

## 2021-08-18 NOTE — PLAN OF CARE
Problem: Adult Inpatient Plan of Care  Goal: Plan of Care Review  Outcome: Improving  Flowsheets  Taken 8/17/2021 1947 by Candi Arenas, RN  Plan of Care Reviewed With:   patient   spouse  Outcome Summary: discussed plan to continue Cytotec until cxns become too close or cervix changes.  Taken 8/17/2021 0754 by Lakeshia Abraham, RN  Progress: improving

## 2021-08-18 NOTE — PROGRESS NOTES
Labor Progress Note:        Assessment:     at 41w2d  Post-dates IOL  Epidural  Pitocin  Internal monitors  GBS neg    Plan:   -Routine CNM support & management.   -Reevaluate progress in 2-3 hours or sooner with a change in status.   -Anticipate progress and NSVB.     Subjective:  Velma Wright is resting on side with peanut ball.  No questions or concerns.     Objective:  B/P: 124/71, T: 98.8, P: 74, R: 16     FHR:Baseline: 160 bpm, Variability: moderate with periods of minimal variability, Accelerations: absent and Decelerations: variable and occasional late    Uterine contractions:IUPC 2-4 minFrequency:IUPC Every 60 minutes, Duration: 60 seconds and Intensity: jaclyn Mcmahon updated via phone regarding maternal/fetal status.    SVE:/-1 per Rn    Provider:  Farideh Guevara CNM, CNP                    [facility]    Note started at 1047 and signed at 1202pm

## 2021-08-19 LAB — HGB BLD-MCNC: 10.1 G/DL (ref 11.7–15.7)

## 2021-08-19 PROCEDURE — 36415 COLL VENOUS BLD VENIPUNCTURE: CPT | Performed by: OBSTETRICS & GYNECOLOGY

## 2021-08-19 PROCEDURE — 250N000013 HC RX MED GY IP 250 OP 250 PS 637: Performed by: OBSTETRICS & GYNECOLOGY

## 2021-08-19 PROCEDURE — 250N000013 HC RX MED GY IP 250 OP 250 PS 637: Performed by: ADVANCED PRACTICE MIDWIFE

## 2021-08-19 PROCEDURE — 85018 HEMOGLOBIN: CPT | Performed by: OBSTETRICS & GYNECOLOGY

## 2021-08-19 PROCEDURE — 120N000001 HC R&B MED SURG/OB

## 2021-08-19 RX ORDER — ACETAMINOPHEN 325 MG/1
650 TABLET ORAL EVERY 4 HOURS PRN
Status: ON HOLD | COMMUNITY
Start: 2021-08-19 | End: 2024-02-12

## 2021-08-19 RX ORDER — DOCUSATE SODIUM 100 MG/1
100 CAPSULE, LIQUID FILLED ORAL DAILY
Qty: 30 CAPSULE | Refills: 0 | Status: ON HOLD | OUTPATIENT
Start: 2021-08-19 | End: 2024-02-12

## 2021-08-19 RX ORDER — FERROUS SULFATE 325(65) MG
325 TABLET ORAL DAILY
Status: DISCONTINUED | OUTPATIENT
Start: 2021-08-19 | End: 2021-08-20 | Stop reason: HOSPADM

## 2021-08-19 RX ORDER — IBUPROFEN 800 MG/1
800 TABLET, FILM COATED ORAL EVERY 6 HOURS PRN
Qty: 30 TABLET | Refills: 0 | Status: ON HOLD | OUTPATIENT
Start: 2021-08-19 | End: 2024-02-12

## 2021-08-19 RX ORDER — FERROUS SULFATE 325(65) MG
325 TABLET ORAL DAILY
Qty: 30 TABLET | Refills: 0 | Status: ON HOLD | OUTPATIENT
Start: 2021-08-19 | End: 2024-02-12

## 2021-08-19 RX ADMIN — ACETAMINOPHEN 650 MG: 325 TABLET ORAL at 01:31

## 2021-08-19 RX ADMIN — IBUPROFEN 800 MG: 800 TABLET ORAL at 05:06

## 2021-08-19 RX ADMIN — Medication 1 G: at 10:08

## 2021-08-19 RX ADMIN — ACETAMINOPHEN 650 MG: 325 TABLET ORAL at 19:11

## 2021-08-19 RX ADMIN — ACETAMINOPHEN 650 MG: 325 TABLET ORAL at 10:07

## 2021-08-19 RX ADMIN — DOCUSATE SODIUM 100 MG: 100 CAPSULE, LIQUID FILLED ORAL at 19:10

## 2021-08-19 RX ADMIN — FERROUS SULFATE TAB 325 MG (65 MG ELEMENTAL FE) 325 MG: 325 (65 FE) TAB at 19:11

## 2021-08-19 RX ADMIN — IBUPROFEN 800 MG: 800 TABLET ORAL at 11:40

## 2021-08-19 NOTE — DISCHARGE SUMMARY
OB Discharge Summary      Date:  2021    Name:  Velma Wright  :  1995  MRN:  [id number]      Admission Date:  2021  Delivery Date:  @BABYSUPPRESS(DBLINK,ept,110,,1,,)@  Gestational Age at Delivery:  41w2d  Discharge Date:  2021    Principal Diagnosis:    Patient Active Problem List   Diagnosis     CARDIOVASCULAR SCREENING; LDL GOAL LESS THAN 160     Routine screening for STI (sexually transmitted infection)     UTI (urinary tract infection)     Protein in urine     Encounter for triage in pregnant patient     Pregnancy       Other Diagnosis:  Acute blood loss anemia    Conditions complicating Pregnancy: Placental lakes that resolved at 24 weeks and polyhydramnios at 33 weeks that resolved at 37 weeks.    Procedure(s) Performed:      Indication for :  n/a  Indication for Induction:  Post dates     Condition at Discharge:  stable    Discharge Medications:    Velma Wright   Home Medication Instructions BUNNY:[id number]    Printed on:21 1309   Medication Information                      acetaminophen (TYLENOL) 325 MG tablet  Take 2 tablets (650 mg) by mouth every 4 hours as needed for mild pain or fever (greater than or equal to 38  C /100.4  F (oral) or 38.5  C/ 101.4  F (core).)             docusate sodium (COLACE) 100 MG capsule  Take 1 capsule (100 mg) by mouth daily             ferrous sulfate (FEROSUL) 325 (65 Fe) MG tablet  Take 1 tablet (325 mg) by mouth daily             ibuprofen (ADVIL/MOTRIN) 800 MG tablet  Take 1 tablet (800 mg) by mouth every 6 hours as needed for other (cramping)             Prenatal Vit-Fe Fumarate-FA (PRENATAL MULTIVITAMIN W/IRON) 27-0.8 MG tablet  Take 1 tablet by mouth daily                 Discharge Plan:    Follow up with /INDRAM:  [facility]   Patient Instructions:      Physical activity: as tolerated    Diet:  regular    Medication:  As listed above        Physician/CNM:  Teresa Mathew CNM    Name:  Velma G Wright  :   1995  MRN:  [id number]

## 2021-08-19 NOTE — ANESTHESIA POSTPROCEDURE EVALUATION
Patient: Velma Wright    * No procedures listed *    Diagnosis:* No pre-op diagnosis entered *  Diagnosis Additional Information: No value filed.    Anesthesia Type:  Epidural    Note:  Disposition: Inpatient (OB post partum)   Postop Pain Control: Uneventful            Sign Out: Well controlled pain   PONV: No   Neuro/Psych: Uneventful            Sign Out: Acceptable/Baseline neuro status   Airway/Respiratory: Uneventful            Sign Out: Acceptable/Baseline resp. status   CV/Hemodynamics: Uneventful            Sign Out: Acceptable CV status; No obvious hypovolemia; No obvious fluid overload   Other NRE: NONE   DID A NON-ROUTINE EVENT OCCUR? No           Last vitals:  Vitals Value Taken Time   BP     Temp     Pulse     Resp     SpO2         Electronically Signed By: Malia Stanley MD  August 19, 2021  3:59 PM

## 2021-08-19 NOTE — LACTATION NOTE
This note was copied from a baby's chart.  This writer met with Velma per lactation consult order.  She has struggled, at times, to latch infant onto the breast and has been pumping her breasts and feeding infant 5 ml expressed colostrum each feed.  This writer educated Velma on hand expressing, proper positioning of infant at breast, asymmetrical latch techniques, cluster feeding, supply and demand, listening for infant's swallows and how to use breast compression to assist with milk transfer.  Mother was encouraged to continue breastfeeding 8-12 times in 24 hours, as infant cues.  Encouraged to follow up [facility] after discharge for any breastfeeding questions or concerns.  After education, she was able to latch infant deeply onto the breast.  Infant able to rhythmically suck with swallows heard.  Swallows increased when breast compression was used.  Velma verbalizes understanding of all education given.  She denies any further questions.  She will call for lactation prn.

## 2021-08-19 NOTE — PROGRESS NOTES
"Vaginal Delivery Postpartum Day 1    Patient Name:  Velma Wright  :      1995  MRN:      [id number]      Assessment:  Normal postpartum course, acute blood loss anemia.    Plan:  Continue current care, PO ferrous sulfate, discharge to home.    Subjective:  The patient feels well:  Voiding without difficulty, lochia normal, tolerating normal diet, and passing flatus.  Pain is well controlled with current medications.  The patient has no emotional concerns.  The baby is well and being fed by both breast and bottle.    Objective:  /55   Pulse 94   Temp 97.9  F (36.6  C) (Oral)   Resp 18   Ht 1.651 m (5' 5\")   Wt 96.2 kg (212 lb)   SpO2 97%   Breastfeeding Yes   BMI 35.28 kg/m    Patient Vitals for the past 24 hrs:   BP Temp Temp src Pulse Resp SpO2   21 0131 103/55 97.9  F (36.6  C) Oral 94 18 97 %   21 -- 98.6  F (37  C) -- -- -- --   21 106/59 98.6  F (37  C) Axillary 97 18 97 %   21 -- 98.6  F (37  C) Oral -- -- --   21 1751 118/56 -- -- -- -- --   21 1736 116/58 -- -- -- 18 --   21 1721 122/61 -- -- -- 18 --   21 1706 125/59 -- -- -- -- --   21 1651 130/57 -- -- -- 20 --   21 1637 129/59 -- -- -- 18 --   21 1622 128/65 -- -- -- 18 --   21 1551 119/58 -- -- -- 20 96 %   21 1536 118/59 -- -- -- -- 96 %   21 1521 119/58 99.8  F (37.7  C) Oral -- -- 96 %   21 1442 -- -- -- -- -- 97 %   21 1430 -- -- -- -- 28 96 %   21 1426 -- -- -- -- -- 97 %   21 1415 -- -- -- -- -- 98 %   21 1405 -- -- -- -- -- 97 %   21 1331 -- -- -- -- -- 100 %   21 1328 -- 99.7  F (37.6  C) Oral -- -- --   21 1300 127/69 -- Oral -- 18 99 %   21 1243 -- 100.1  F (37.8  C) Oral -- -- 99 %   21 1230 119/57 -- -- -- -- 99 %   21 1215 -- -- -- -- -- 99 %   21 1200 120/56 -- -- -- 18 99 %   21 1130 116/59 -- -- -- 18 100 %   21 1100 120/64 99.4  F " (37.4  C) Oral -- 18 100 %   21 1033 124/71 -- -- -- -- --   21 1029 -- 98.8  F (37.1  C) Oral -- 16 100 %   21 1003 118/57 -- -- -- 18 98 %       The amount and color of the lochia is appropriate for the duration of recovery.  The uterine fundus is firm.  Urinary output is adequate.     Lab Results   Component Value Date    AS Negative 2021    CHPCRT  2016     Negative   Negative for C. trachomatis rRNA by transcription mediated amplification.   A negative result by transcription mediated amplification does not preclude the   presence of C. trachomatis infection because results are dependent on proper   and adequate collection, absence of inhibitors, and sufficient rRNA to be   detected.      GCPCRT  2016     Negative   Negative for N. gonorrhoeae rRNA by transcription mediated amplification.   A negative result by transcription mediated amplification does not preclude the   presence of N. gonorrhoeae infection because results are dependent on proper   and adequate collection, absence of inhibitors, and sufficient rRNA to be   detected.      HGB 10.1 (L) 2021       Immunization History   Administered Date(s) Administered     HEPA 2010     HPV 2009, 2010     Tdap (Adacel,Boostrix) 02/15/2012       Provider:  Teresa FRASER CNM  [facility]  2021 9:36 AM    Patient Name:  Velma Wright  :  1995  MRN:  [id number]

## 2021-08-19 NOTE — PLAN OF CARE
"  Problem: Adjustment to Role Transition (Postpartum Vaginal Delivery)  Goal: Successful Maternal Role Transition  Outcome: Improving     Problem: Bleeding (Postpartum Vaginal Delivery)  Goal: Hemostasis  Outcome: Improving     Problem: Infection (Postpartum Vaginal Delivery)  Goal: Absence of Infection Signs and Symptoms  Outcome: Improving     Problem: Pain (Postpartum Vaginal Delivery)  Goal: Acceptable Pain Control  Outcome: Improving  Intervention: Prevent or Manage Pain  Recent Flowsheet Documentation  Taken 8/19/2021 1007 by Mele Mcarthur RN  Pain Management Interventions: medication (see MAR)     Problem: Urinary Retention (Postpartum Vaginal Delivery)  Goal: Effective Urinary Elimination  Outcome: Improving   /58   Pulse 84   Temp 97.7  F (36.5  C) (Axillary)   Resp 20   Ht 1.651 m (5' 5\")   Wt 96.2 kg (212 lb)   SpO2 99%   Breastfeeding Yes   BMI 35.28 kg/m    Patient is managing pain with medications, lochia is scant and fundus is firm, midline and U-1.  Breast feeding/pumping and its going well.  Lactation and myself has helped patient with football hold and it went well.  Talked to mom regarding baby going to nicu to be watched closer per md.  "

## 2021-08-19 NOTE — PLAN OF CARE
Problem: Adjustment to Role Transition (Postpartum Vaginal Delivery)  Goal: Successful Maternal Role Transition  Outcome: Improving     Problem: Pain (Postpartum Vaginal Delivery)  Goal: Acceptable Pain Control  Outcome: Improving     Vitals and assessments WNL  Mom is pumping and getting approx 5 ml each session.  Encouraged to call for tips to help get baby to latch   PRN tylenol and ice packs given for perineal pain at start of shift, PRN ibuprofen given for cramping pain after pumping 2nd time this shift.

## 2021-08-20 VITALS
TEMPERATURE: 98.6 F | BODY MASS INDEX: 35.32 KG/M2 | HEIGHT: 65 IN | DIASTOLIC BLOOD PRESSURE: 63 MMHG | OXYGEN SATURATION: 97 % | WEIGHT: 212 LBS | RESPIRATION RATE: 18 BRPM | HEART RATE: 96 BPM | SYSTOLIC BLOOD PRESSURE: 108 MMHG

## 2021-08-20 PROCEDURE — 250N000013 HC RX MED GY IP 250 OP 250 PS 637: Performed by: ADVANCED PRACTICE MIDWIFE

## 2021-08-20 PROCEDURE — 250N000013 HC RX MED GY IP 250 OP 250 PS 637: Performed by: OBSTETRICS & GYNECOLOGY

## 2021-08-20 RX ADMIN — FERROUS SULFATE TAB 325 MG (65 MG ELEMENTAL FE) 325 MG: 325 (65 FE) TAB at 09:43

## 2021-08-20 RX ADMIN — IBUPROFEN 800 MG: 800 TABLET ORAL at 06:04

## 2021-08-20 RX ADMIN — IBUPROFEN 800 MG: 800 TABLET ORAL at 12:00

## 2021-08-20 RX ADMIN — DOCUSATE SODIUM 100 MG: 100 CAPSULE, LIQUID FILLED ORAL at 09:43

## 2021-08-20 RX ADMIN — Medication: at 09:48

## 2021-08-20 NOTE — PLAN OF CARE
Problem: Adult Inpatient Plan of Care  Goal: Patient-Specific Goal (Individualized)  Outcome: Improving     Problem: Pain (Postpartum Vaginal Delivery)  Goal: Acceptable Pain Control  Outcome: Improving  Intervention: Prevent or Manage Pain  Recent Flowsheet Documentation  Taken 8/19/2021 2246 by Sol Lange, RN  Pain Management Interventions: declines  Taken 8/19/2021 2000 by Sol Lange, RN  Pain Management Interventions: declines   Pt has been in NICU majority of shift, bonding with infant Micaela. Pt stated her pain has been controlled and declines any interventions at this time. Pt is resting in room with spouse. Pt will continue to rate pain less than 3 during shift. Went over a lot of breast care information with patient, jessica she is pumping while away from infant. Pt would like to stay in room tomorrow as long as possible to bind with infant and it is a 40 minute drive. Updated oncoming RN. Education provided. Addressed any questions and concerns. Will continue to monitor.

## 2021-08-20 NOTE — DISCHARGE SUMMARY
OB Discharge Summary      Date:  2021    Name:  Velma Wright  :  1995  MRN:  [id number]      Admission Date:  2021  Delivery Date:  2021  Gestational Age at Delivery:  41w2d  Discharge Date:  2021    Principal Diagnosis:    Patient Active Problem List   Diagnosis     CARDIOVASCULAR SCREENING; LDL GOAL LESS THAN 160     Routine screening for STI (sexually transmitted infection)     UTI (urinary tract infection)     Protein in urine     Encounter for triage in pregnant patient     Pregnancy       Other Diagnosis:      Conditions complicating Pregnancy:  Placental lakes on FAS and at 24w-resolved  Polyhydramnios (LAZARUS at 26) noted on 33w US, resolved at 37w    Procedure(s) Performed:      Indication for :  N/A  Indication for Induction:  Post dates     Condition at Discharge:  Stable    Discharge Medications:    Velma Wright   Home Medication Instructions BUNNY:[id number]    Printed on:21 1038   Medication Information                      acetaminophen (TYLENOL) 325 MG tablet  Take 2 tablets (650 mg) by mouth every 4 hours as needed for mild pain or fever (greater than or equal to 38  C /100.4  F (oral) or 38.5  C/ 101.4  F (core).)             docusate sodium (COLACE) 100 MG capsule  Take 1 capsule (100 mg) by mouth daily             ferrous sulfate (FEROSUL) 325 (65 Fe) MG tablet  Take 1 tablet (325 mg) by mouth daily             ibuprofen (ADVIL/MOTRIN) 800 MG tablet  Take 1 tablet (800 mg) by mouth every 6 hours as needed for other (cramping)             Prenatal Vit-Fe Fumarate-FA (PRENATAL MULTIVITAMIN W/IRON) 27-0.8 MG tablet  Take 1 tablet by mouth daily                 Discharge Plan:    Follow up with /INDRAM:  At 6 weeks postpartum   Patient Instructions:      Physical activity: As tolerated, nothing in the vagina for 6 weeks    Diet:  As tolerated    Medication:  See above    Other:        Physician/CNM:  Margaux Hua CNM    Name:  Velma BRITTNEY Adriana  :   1995  MRN:  [id number]

## 2021-08-20 NOTE — PROGRESS NOTES
"Vaginal Delivery Postpartum Day 2    Patient Name:  Velma Wright  :      1995  MRN:      [id number]      Assessment:  Normal postpartum course.     Plan:  Continue current care. Visit completed with mother in NICU, plan discharge today as late as possible.     Subjective:  The patient feels well:  Voiding without difficulty, lochia normal, tolerating normal diet, and passing flatus.  Pain is well controlled with current medications.  The patient has no emotional concerns.  The baby is NICU secondary to possible sepsis and being fed by breast, working with lactation in NICU.    Objective:  /63   Pulse 96   Temp 97.8  F (36.6  C) (Oral)   Resp 18   Ht 1.651 m (5' 5\")   Wt 96.2 kg (212 lb)   SpO2 97%   Breastfeeding Yes   BMI 35.28 kg/m      .  Patient Vitals for the past 24 hrs:   BP Temp Temp src Pulse Resp SpO2   21 0947 108/63 97.8  F (36.6  C) Oral 96 18 97 %   21 0604 110/59 97.8  F (36.6  C) Oral 77 16 98 %   21 2246 117/63 98.8  F (37.1  C) Oral 97 18 96 %       The amount and color of the lochia is appropriate for the duration of recovery.  The uterine fundus is firm.  Urinary output is adequate.     Lab Results   Component Value Date    AS Negative 2021    CHPCRT  2016     Negative   Negative for C. trachomatis rRNA by transcription mediated amplification.   A negative result by transcription mediated amplification does not preclude the   presence of C. trachomatis infection because results are dependent on proper   and adequate collection, absence of inhibitors, and sufficient rRNA to be   detected.      GCPCRT  2016     Negative   Negative for N. gonorrhoeae rRNA by transcription mediated amplification.   A negative result by transcription mediated amplification does not preclude the   presence of N. gonorrhoeae infection because results are dependent on proper   and adequate collection, absence of inhibitors, and sufficient rRNA to be   " detected.      HGB 10.1 (L) 2021       Immunization History   Administered Date(s) Administered     HEPA 2010     HPV 2009, 2010     Tdap (Adacel,Boostrix) 02/15/2012       Provider:  Margaux Hua CNM    Date:  2021  Time:  10:36 AM    Patient Name:  Velma Wright  :      1995  MRN:      [id number]

## 2021-08-20 NOTE — PLAN OF CARE
Problem: Adjustment to Role Transition (Postpartum Vaginal Delivery)  Goal: Successful Maternal Role Transition  8/20/2021 1044 by Lydia Luna, RN  Outcome: Improving     Problem: Pain (Postpartum Vaginal Delivery)  Goal: Acceptable Pain Control  8/20/2021 1044 by Lydia Luna, RN  Outcome: Improving     Vitals and assessments WNL, patient expressed pain relief after taking ibuprofen at 0600.  Has been up to NICU multiple times to see/feed baby

## 2021-08-20 NOTE — PROGRESS NOTES
Currently in Epic there is a discrepancy in rubella values. A hard copy of the lab results were obtained and placed in paper chart. This RN was unable to change the values in Epic. Per lab results the patient is immune to rubella with a value of 5.67. (>1.0 consistent with immunity.) MMR order discontinued.

## 2021-08-20 NOTE — PLAN OF CARE
Problem: Pain (Postpartum Vaginal Delivery)  Goal: Acceptable Pain Control  Outcome: Improving     Vitals and assessments WNL.   Rates perineal pain 3/10 - prn ibuprofen given   Pt pumped just before midnight and had a good nights rest, then pumped again around 0600

## 2021-08-20 NOTE — PLAN OF CARE
Problem: Adult Inpatient Plan of Care  Goal: Patient-Specific Goal (Individualized)  Outcome: Completed   Discharge instructions and warning signs reviewed with patient and spouse. They verbalize understanding.

## 2021-08-23 LAB
PATH REPORT.COMMENTS IMP SPEC: NORMAL
PATH REPORT.COMMENTS IMP SPEC: NORMAL
PATH REPORT.FINAL DX SPEC: NORMAL
PATH REPORT.GROSS SPEC: NORMAL
PATH REPORT.MICROSCOPIC SPEC OTHER STN: NORMAL
PATH REPORT.RELEVANT HX SPEC: NORMAL
PHOTO IMAGE: NORMAL

## 2021-08-23 PROCEDURE — 88307 TISSUE EXAM BY PATHOLOGIST: CPT | Mod: 26 | Performed by: PATHOLOGY

## 2021-10-03 ENCOUNTER — HEALTH MAINTENANCE LETTER (OUTPATIENT)
Age: 26
End: 2021-10-03

## 2022-05-14 ENCOUNTER — HEALTH MAINTENANCE LETTER (OUTPATIENT)
Age: 27
End: 2022-05-14

## 2022-09-04 ENCOUNTER — HEALTH MAINTENANCE LETTER (OUTPATIENT)
Age: 27
End: 2022-09-04

## 2023-06-03 ENCOUNTER — HEALTH MAINTENANCE LETTER (OUTPATIENT)
Age: 28
End: 2023-06-03

## 2023-09-11 LAB
ABO (EXTERNAL): NORMAL
HEMOGLOBIN (EXTERNAL): 12.9 G/DL (ref 11.7–15.5)
HEPATITIS B SURFACE ANTIGEN (EXTERNAL): NONREACTIVE
HIV1+2 AB SERPL QL IA: NONREACTIVE
RH (EXTERNAL): POSITIVE
RUBELLA ANTIBODY IGG (EXTERNAL): NORMAL
TREPONEMA PALLIDUM ANTIBODY (EXTERNAL): NONREACTIVE

## 2023-10-10 ENCOUNTER — TRANSFERRED RECORDS (OUTPATIENT)
Dept: HEALTH INFORMATION MANAGEMENT | Facility: CLINIC | Age: 28
End: 2023-10-10

## 2024-01-29 LAB — TREPONEMA PALLIDUM ANTIBODY (EXTERNAL): NONREACTIVE

## 2024-02-12 ENCOUNTER — APPOINTMENT (OUTPATIENT)
Dept: ULTRASOUND IMAGING | Facility: HOSPITAL | Age: 29
End: 2024-02-12
Attending: STUDENT IN AN ORGANIZED HEALTH CARE EDUCATION/TRAINING PROGRAM
Payer: COMMERCIAL

## 2024-02-12 ENCOUNTER — HOSPITAL ENCOUNTER (OUTPATIENT)
Facility: HOSPITAL | Age: 29
End: 2024-02-12
Admitting: STUDENT IN AN ORGANIZED HEALTH CARE EDUCATION/TRAINING PROGRAM
Payer: COMMERCIAL

## 2024-02-12 ENCOUNTER — HOSPITAL ENCOUNTER (OUTPATIENT)
Facility: HOSPITAL | Age: 29
Discharge: HOME | End: 2024-02-12
Attending: STUDENT IN AN ORGANIZED HEALTH CARE EDUCATION/TRAINING PROGRAM | Admitting: STUDENT IN AN ORGANIZED HEALTH CARE EDUCATION/TRAINING PROGRAM
Payer: COMMERCIAL

## 2024-02-12 VITALS — RESPIRATION RATE: 18 BRPM | TEMPERATURE: 99 F | SYSTOLIC BLOOD PRESSURE: 120 MMHG | DIASTOLIC BLOOD PRESSURE: 75 MMHG

## 2024-02-12 PROCEDURE — G0463 HOSPITAL OUTPT CLINIC VISIT: HCPCS | Mod: 25

## 2024-02-12 PROCEDURE — 76819 FETAL BIOPHYS PROFIL W/O NST: CPT

## 2024-02-12 ASSESSMENT — ACTIVITIES OF DAILY LIVING (ADL): ADLS_ACUITY_SCORE: 20

## 2024-02-13 NOTE — PROGRESS NOTES
Presents here today from clinic with elevated fetal tachycardia.  Fetal Monitor placed onto abdomen. Base line 140s.

## 2024-02-13 NOTE — PROGRESS NOTES
Dr Louis paged and informed of ultrasound results. Informed her also that baseline is 140s with a category 1 tracing. Orders received to discharge to home.

## 2024-02-13 NOTE — DISCHARGE INSTRUCTIONS
Learning About When to Call Your Doctor During Pregnancy (After 20 Weeks)  Overview  It's common to have concerns about what might be a problem when you're pregnant. Most pregnancies don't have any serious problems. But it's still important to know when to call your doctor if you have certain symptoms or signs of labor.  These are general suggestions. Your doctor may give you some more information about when to call.  When to call your doctor (after 20 weeks)  Call 911  anytime you think you may need emergency care. For example, call if:  You have severe vaginal bleeding. This means you are soaking through a pad each hour for 2 or more hours.  You have sudden, severe pain in your belly.  You have chest pain, are short of breath, or cough up blood.  You passed out (lost consciousness).  You have a seizure.  You see or feel the umbilical cord.  You think you are about to deliver your baby and can't make it safely to the hospital or birthing center.  Call your doctor now or seek immediate medical care if:  You have vaginal bleeding.  You have belly pain.  You have a fever.  You are dizzy or lightheaded, or you feel like you may faint.  You have signs of a blood clot in your leg (called a deep vein thrombosis), such as:  Pain in the calf, back of the knee, thigh, or groin.  Swelling in your leg or groin.  A color change on the leg or groin. The skin may be reddish or purplish, depending on your usual skin color.  You have symptoms of preeclampsia, such as:  Sudden swelling of your face, hands, or feet.  New vision problems (such as dimness, blurring, or seeing spots).  A severe headache.  You have a sudden release of fluid from your vagina. (You think your water broke.)  You've been having regular contractions for an hour. This means that you've had at least 6 contractions within 1 hour, even after you change your position and drink fluids.  You notice that your baby has stopped moving or is moving less than  "normal.  You have signs of heart failure, such as:  New or increased shortness of breath.  New or worse swelling in your legs, ankles, or feet.  Sudden weight gain, such as more than 2 to 3 pounds in a day or 5 pounds in a week.  Feeling so tired or weak that you cannot do your usual activities.  You have symptoms of a urinary tract infection. These may include:  Pain or burning when you urinate.  A frequent need to urinate without being able to pass much urine.  Pain in the flank, which is just below the rib cage and above the waist on either side of the back.  Blood in your urine.  Watch closely for changes in your health, and be sure to contact your doctor if:  You have vaginal discharge that smells bad.  You feel sad, anxious, or hopeless for more than a few days.  You have skin changes, such as a rash, itching, or a yellow color to your skin.  You have other concerns about your pregnancy.  If you have labor signs at 37 weeks or more  If you have signs of labor at 37 weeks or more, your doctor may tell you to call when your labor becomes more active. Symptoms of active labor include:  Contractions that are regular.  Contractions that are less than 5 minutes apart.  Contractions that are hard to talk through.  Follow-up care is a key part of your treatment and safety. Be sure to make and go to all appointments, and call your doctor if you are having problems. It's also a good idea to know your test results and keep a list of the medicines you take.  Where can you learn more?  Go to https://www.[employer].net/patiented  Enter N531 in the search box to learn more about \"Learning About When to Call Your Doctor During Pregnancy (After 20 Weeks).\"  Current as of: July 11, 2023               Content Version: 13.8    2058-3126 [employer].   Care instructions adapted under license by your healthcare professional. If you have questions about a medical condition or this instruction, always ask your healthcare " professional. [employer] disclaims any warranty or liability for your use of this information.

## 2024-03-29 LAB
GROUP B STREPTOCOCCUS (EXTERNAL): NEGATIVE
GROUP B STREPTOCOCCUS (EXTERNAL): NEGATIVE

## 2024-04-22 ENCOUNTER — ANESTHESIA (OUTPATIENT)
Dept: OBGYN | Facility: HOSPITAL | Age: 29
End: 2024-04-22
Payer: COMMERCIAL

## 2024-04-22 ENCOUNTER — HOSPITAL ENCOUNTER (INPATIENT)
Facility: HOSPITAL | Age: 29
LOS: 2 days | Discharge: HOME | End: 2024-04-24
Attending: STUDENT IN AN ORGANIZED HEALTH CARE EDUCATION/TRAINING PROGRAM | Admitting: STUDENT IN AN ORGANIZED HEALTH CARE EDUCATION/TRAINING PROGRAM
Payer: COMMERCIAL

## 2024-04-22 ENCOUNTER — ANESTHESIA EVENT (OUTPATIENT)
Dept: OBGYN | Facility: HOSPITAL | Age: 29
End: 2024-04-22
Payer: COMMERCIAL

## 2024-04-22 PROBLEM — Z36.89 ENCOUNTER FOR TRIAGE IN PREGNANT PATIENT: Status: ACTIVE | Noted: 2021-08-16

## 2024-04-22 PROBLEM — Z34.90 PREGNANCY: Status: ACTIVE | Noted: 2021-08-16

## 2024-04-22 LAB
ABO/RH(D): NORMAL
ANTIBODY SCREEN: NEGATIVE
HGB BLD-MCNC: 11.2 G/DL (ref 11.7–15.7)
SPECIMEN EXPIRATION DATE: NORMAL
T PALLIDUM AB SER QL: NONREACTIVE

## 2024-04-22 PROCEDURE — 370N000003 HC ANESTHESIA WARD SERVICE: Performed by: ANESTHESIOLOGY

## 2024-04-22 PROCEDURE — 00HU33Z INSERTION OF INFUSION DEVICE INTO SPINAL CANAL, PERCUTANEOUS APPROACH: ICD-10-PCS | Performed by: ANESTHESIOLOGY

## 2024-04-22 PROCEDURE — 250N000009 HC RX 250: Performed by: STUDENT IN AN ORGANIZED HEALTH CARE EDUCATION/TRAINING PROGRAM

## 2024-04-22 PROCEDURE — 10907ZC DRAINAGE OF AMNIOTIC FLUID, THERAPEUTIC FROM PRODUCTS OF CONCEPTION, VIA NATURAL OR ARTIFICIAL OPENING: ICD-10-PCS | Performed by: STUDENT IN AN ORGANIZED HEALTH CARE EDUCATION/TRAINING PROGRAM

## 2024-04-22 PROCEDURE — 258N000003 HC RX IP 258 OP 636: Performed by: STUDENT IN AN ORGANIZED HEALTH CARE EDUCATION/TRAINING PROGRAM

## 2024-04-22 PROCEDURE — 3E0R3BZ INTRODUCTION OF ANESTHETIC AGENT INTO SPINAL CANAL, PERCUTANEOUS APPROACH: ICD-10-PCS | Performed by: ANESTHESIOLOGY

## 2024-04-22 PROCEDURE — 85018 HEMOGLOBIN: CPT | Performed by: STUDENT IN AN ORGANIZED HEALTH CARE EDUCATION/TRAINING PROGRAM

## 2024-04-22 PROCEDURE — 250N000011 HC RX IP 250 OP 636: Performed by: ANESTHESIOLOGY

## 2024-04-22 PROCEDURE — 86780 TREPONEMA PALLIDUM: CPT | Performed by: STUDENT IN AN ORGANIZED HEALTH CARE EDUCATION/TRAINING PROGRAM

## 2024-04-22 PROCEDURE — 36415 COLL VENOUS BLD VENIPUNCTURE: CPT | Performed by: STUDENT IN AN ORGANIZED HEALTH CARE EDUCATION/TRAINING PROGRAM

## 2024-04-22 PROCEDURE — 250N000013 HC RX MED GY IP 250 OP 250 PS 637: Performed by: STUDENT IN AN ORGANIZED HEALTH CARE EDUCATION/TRAINING PROGRAM

## 2024-04-22 PROCEDURE — 86900 BLOOD TYPING SEROLOGIC ABO: CPT | Performed by: STUDENT IN AN ORGANIZED HEALTH CARE EDUCATION/TRAINING PROGRAM

## 2024-04-22 PROCEDURE — 120N000001 HC R&B MED SURG/OB

## 2024-04-22 RX ORDER — ONDANSETRON 4 MG/1
4 TABLET, ORALLY DISINTEGRATING ORAL EVERY 6 HOURS PRN
Status: DISCONTINUED | OUTPATIENT
Start: 2024-04-22 | End: 2024-04-23 | Stop reason: HOSPADM

## 2024-04-22 RX ORDER — CITRIC ACID/SODIUM CITRATE 334-500MG
30 SOLUTION, ORAL ORAL
Status: DISCONTINUED | OUTPATIENT
Start: 2024-04-22 | End: 2024-04-23 | Stop reason: HOSPADM

## 2024-04-22 RX ORDER — METHYLERGONOVINE MALEATE 0.2 MG/ML
200 INJECTION INTRAVENOUS
Status: DISCONTINUED | OUTPATIENT
Start: 2024-04-22 | End: 2024-04-23 | Stop reason: HOSPADM

## 2024-04-22 RX ORDER — FENTANYL CITRATE 50 UG/ML
100 INJECTION, SOLUTION INTRAMUSCULAR; INTRAVENOUS
Status: DISCONTINUED | OUTPATIENT
Start: 2024-04-22 | End: 2024-04-23 | Stop reason: HOSPADM

## 2024-04-22 RX ORDER — PROCHLORPERAZINE 25 MG
25 SUPPOSITORY, RECTAL RECTAL EVERY 12 HOURS PRN
Status: DISCONTINUED | OUTPATIENT
Start: 2024-04-22 | End: 2024-04-23 | Stop reason: HOSPADM

## 2024-04-22 RX ORDER — NALOXONE HYDROCHLORIDE 0.4 MG/ML
0.2 INJECTION, SOLUTION INTRAMUSCULAR; INTRAVENOUS; SUBCUTANEOUS
Status: DISCONTINUED | OUTPATIENT
Start: 2024-04-22 | End: 2024-04-23 | Stop reason: HOSPADM

## 2024-04-22 RX ORDER — CARBOPROST TROMETHAMINE 250 UG/ML
250 INJECTION, SOLUTION INTRAMUSCULAR
Status: DISCONTINUED | OUTPATIENT
Start: 2024-04-22 | End: 2024-04-23 | Stop reason: HOSPADM

## 2024-04-22 RX ORDER — OXYTOCIN/0.9 % SODIUM CHLORIDE 30/500 ML
1-24 PLASTIC BAG, INJECTION (ML) INTRAVENOUS CONTINUOUS
Status: DISCONTINUED | OUTPATIENT
Start: 2024-04-22 | End: 2024-04-23 | Stop reason: HOSPADM

## 2024-04-22 RX ORDER — LOPERAMIDE HCL 2 MG
4 CAPSULE ORAL
Status: DISCONTINUED | OUTPATIENT
Start: 2024-04-22 | End: 2024-04-23 | Stop reason: HOSPADM

## 2024-04-22 RX ORDER — TERBUTALINE SULFATE 1 MG/ML
0.25 INJECTION, SOLUTION SUBCUTANEOUS
Status: DISCONTINUED | OUTPATIENT
Start: 2024-04-22 | End: 2024-04-23 | Stop reason: HOSPADM

## 2024-04-22 RX ORDER — OXYTOCIN 10 [USP'U]/ML
10 INJECTION, SOLUTION INTRAMUSCULAR; INTRAVENOUS
Status: DISCONTINUED | OUTPATIENT
Start: 2024-04-22 | End: 2024-04-24 | Stop reason: HOSPADM

## 2024-04-22 RX ORDER — MISOPROSTOL 200 UG/1
800 TABLET ORAL
Status: DISCONTINUED | OUTPATIENT
Start: 2024-04-22 | End: 2024-04-23 | Stop reason: HOSPADM

## 2024-04-22 RX ORDER — BUPIVACAINE HYDROCHLORIDE 2.5 MG/ML
INJECTION, SOLUTION EPIDURAL; INFILTRATION; INTRACAUDAL
Status: COMPLETED | OUTPATIENT
Start: 2024-04-22 | End: 2024-04-22

## 2024-04-22 RX ORDER — FENTANYL CITRATE-0.9 % NACL/PF 10 MCG/ML
100 PLASTIC BAG, INJECTION (ML) INTRAVENOUS EVERY 5 MIN PRN
Status: DISCONTINUED | OUTPATIENT
Start: 2024-04-22 | End: 2024-04-23 | Stop reason: HOSPADM

## 2024-04-22 RX ORDER — LIDOCAINE 40 MG/G
CREAM TOPICAL
Status: DISCONTINUED | OUTPATIENT
Start: 2024-04-22 | End: 2024-04-23 | Stop reason: HOSPADM

## 2024-04-22 RX ORDER — NALOXONE HYDROCHLORIDE 0.4 MG/ML
0.4 INJECTION, SOLUTION INTRAMUSCULAR; INTRAVENOUS; SUBCUTANEOUS
Status: DISCONTINUED | OUTPATIENT
Start: 2024-04-22 | End: 2024-04-23 | Stop reason: HOSPADM

## 2024-04-22 RX ORDER — IBUPROFEN 800 MG/1
800 TABLET, FILM COATED ORAL
Status: DISCONTINUED | OUTPATIENT
Start: 2024-04-22 | End: 2024-04-24 | Stop reason: HOSPADM

## 2024-04-22 RX ORDER — FENTANYL/ROPIVACAINE/NS/PF 2MCG/ML-.1
PLASTIC BAG, INJECTION (ML) EPIDURAL
Status: DISCONTINUED | OUTPATIENT
Start: 2024-04-22 | End: 2024-04-23 | Stop reason: HOSPADM

## 2024-04-22 RX ORDER — METOCLOPRAMIDE 10 MG/1
10 TABLET ORAL EVERY 6 HOURS PRN
Status: DISCONTINUED | OUTPATIENT
Start: 2024-04-22 | End: 2024-04-23 | Stop reason: HOSPADM

## 2024-04-22 RX ORDER — DIPHENHYDRAMINE HYDROCHLORIDE 50 MG/ML
25 INJECTION INTRAMUSCULAR; INTRAVENOUS EVERY 6 HOURS PRN
Status: DISCONTINUED | OUTPATIENT
Start: 2024-04-22 | End: 2024-04-24 | Stop reason: HOSPADM

## 2024-04-22 RX ORDER — MISOPROSTOL 200 UG/1
400 TABLET ORAL
Status: DISCONTINUED | OUTPATIENT
Start: 2024-04-22 | End: 2024-04-23 | Stop reason: HOSPADM

## 2024-04-22 RX ORDER — TRANEXAMIC ACID 10 MG/ML
1 INJECTION, SOLUTION INTRAVENOUS EVERY 30 MIN PRN
Status: DISCONTINUED | OUTPATIENT
Start: 2024-04-22 | End: 2024-04-23 | Stop reason: HOSPADM

## 2024-04-22 RX ORDER — LIDOCAINE 40 MG/G
CREAM TOPICAL
Status: DISCONTINUED | OUTPATIENT
Start: 2024-04-22 | End: 2024-04-22 | Stop reason: HOSPADM

## 2024-04-22 RX ORDER — KETOROLAC TROMETHAMINE 30 MG/ML
30 INJECTION, SOLUTION INTRAMUSCULAR; INTRAVENOUS
Status: DISCONTINUED | OUTPATIENT
Start: 2024-04-22 | End: 2024-04-24 | Stop reason: HOSPADM

## 2024-04-22 RX ORDER — PRENATAL VIT/IRON FUM/FOLIC AC 27MG-0.8MG
1 TABLET ORAL DAILY
Status: DISCONTINUED | OUTPATIENT
Start: 2024-04-22 | End: 2024-04-22

## 2024-04-22 RX ORDER — DIPHENHYDRAMINE HCL 25 MG
25 CAPSULE ORAL EVERY 6 HOURS PRN
Status: DISCONTINUED | OUTPATIENT
Start: 2024-04-22 | End: 2024-04-24 | Stop reason: HOSPADM

## 2024-04-22 RX ORDER — LOPERAMIDE HCL 2 MG
2 CAPSULE ORAL
Status: DISCONTINUED | OUTPATIENT
Start: 2024-04-22 | End: 2024-04-23 | Stop reason: HOSPADM

## 2024-04-22 RX ORDER — SODIUM CHLORIDE, SODIUM LACTATE, POTASSIUM CHLORIDE, CALCIUM CHLORIDE 600; 310; 30; 20 MG/100ML; MG/100ML; MG/100ML; MG/100ML
INJECTION, SOLUTION INTRAVENOUS CONTINUOUS PRN
Status: DISCONTINUED | OUTPATIENT
Start: 2024-04-22 | End: 2024-04-23 | Stop reason: HOSPADM

## 2024-04-22 RX ORDER — OXYTOCIN/0.9 % SODIUM CHLORIDE 30/500 ML
340 PLASTIC BAG, INJECTION (ML) INTRAVENOUS CONTINUOUS PRN
Status: DISCONTINUED | OUTPATIENT
Start: 2024-04-22 | End: 2024-04-23 | Stop reason: HOSPADM

## 2024-04-22 RX ORDER — CALCIUM CARBONATE 500 MG/1
500 TABLET, CHEWABLE ORAL DAILY PRN
Status: DISCONTINUED | OUTPATIENT
Start: 2024-04-22 | End: 2024-04-24 | Stop reason: HOSPADM

## 2024-04-22 RX ORDER — PROCHLORPERAZINE MALEATE 10 MG
10 TABLET ORAL EVERY 6 HOURS PRN
Status: DISCONTINUED | OUTPATIENT
Start: 2024-04-22 | End: 2024-04-23 | Stop reason: HOSPADM

## 2024-04-22 RX ORDER — OXYTOCIN 10 [USP'U]/ML
10 INJECTION, SOLUTION INTRAMUSCULAR; INTRAVENOUS
Status: DISCONTINUED | OUTPATIENT
Start: 2024-04-22 | End: 2024-04-23 | Stop reason: HOSPADM

## 2024-04-22 RX ORDER — ACETAMINOPHEN 325 MG/1
650 TABLET ORAL EVERY 4 HOURS PRN
Status: DISCONTINUED | OUTPATIENT
Start: 2024-04-22 | End: 2024-04-23 | Stop reason: HOSPADM

## 2024-04-22 RX ORDER — ONDANSETRON 2 MG/ML
4 INJECTION INTRAMUSCULAR; INTRAVENOUS EVERY 6 HOURS PRN
Status: DISCONTINUED | OUTPATIENT
Start: 2024-04-22 | End: 2024-04-23 | Stop reason: HOSPADM

## 2024-04-22 RX ORDER — METOCLOPRAMIDE HYDROCHLORIDE 5 MG/ML
10 INJECTION INTRAMUSCULAR; INTRAVENOUS EVERY 6 HOURS PRN
Status: DISCONTINUED | OUTPATIENT
Start: 2024-04-22 | End: 2024-04-23 | Stop reason: HOSPADM

## 2024-04-22 RX ORDER — OXYTOCIN/0.9 % SODIUM CHLORIDE 30/500 ML
100-340 PLASTIC BAG, INJECTION (ML) INTRAVENOUS CONTINUOUS PRN
Status: DISCONTINUED | OUTPATIENT
Start: 2024-04-22 | End: 2024-04-24 | Stop reason: HOSPADM

## 2024-04-22 RX ADMIN — SODIUM CHLORIDE, POTASSIUM CHLORIDE, SODIUM LACTATE AND CALCIUM CHLORIDE: 600; 310; 30; 20 INJECTION, SOLUTION INTRAVENOUS at 21:46

## 2024-04-22 RX ADMIN — CALCIUM CARBONATE (ANTACID) CHEW TAB 500 MG 500 MG: 500 CHEW TAB at 10:39

## 2024-04-22 RX ADMIN — BUPIVACAINE HYDROCHLORIDE 5 ML: 2.5 INJECTION, SOLUTION EPIDURAL; INFILTRATION; INTRACAUDAL at 19:40

## 2024-04-22 RX ADMIN — SODIUM CHLORIDE, POTASSIUM CHLORIDE, SODIUM LACTATE AND CALCIUM CHLORIDE: 600; 310; 30; 20 INJECTION, SOLUTION INTRAVENOUS at 09:01

## 2024-04-22 RX ADMIN — SODIUM CHLORIDE, POTASSIUM CHLORIDE, SODIUM LACTATE AND CALCIUM CHLORIDE: 600; 310; 30; 20 INJECTION, SOLUTION INTRAVENOUS at 16:44

## 2024-04-22 RX ADMIN — Medication 2 MILLI-UNITS/MIN: at 09:03

## 2024-04-22 RX ADMIN — Medication: at 19:39

## 2024-04-22 ASSESSMENT — ACTIVITIES OF DAILY LIVING (ADL)
ADLS_ACUITY_SCORE: 20

## 2024-04-22 NOTE — PLAN OF CARE
Problem: Labor  Goal: Hemostasis  Outcome: Progressing  Goal: Stable Fetal Wellbeing  Outcome: Progressing  Intervention: Promote and Monitor Fetal Wellbeing  Recent Flowsheet Documentation  Taken 2024 1600 by Candie Hare RN  Body Position: position changed independently  Fetal Wellbeing Promotion:   fetal heart rate monitored   intake and output monitored   obstetric care provider contacted  Goal: Effective Progression to Delivery  Outcome: Progressing  Goal: Absence of Infection Signs and Symptoms  Outcome: Progressing  Intervention: Prevent or Manage Infection  Recent Flowsheet Documentation  Taken 2024 1600 by Candie Hare RN  Infection Prevention:   single patient room provided   rest/sleep promoted   hand hygiene promoted  Infection Management: aseptic technique maintained  Goal: Acceptable Pain Control  Outcome: Progressing  Intervention: Support Labor Pain Coping and Management  Recent Flowsheet Documentation  Taken 2024 1600 by Candie Hare RN  Sensory Stimulation Regulation:   care clustered   lighting decreased  Goal: Normal Uterine Contraction Pattern  Outcome: Progressing  Intervention: Monitor and Manage Uterine Activity  Recent Flowsheet Documentation  Taken 2024 1600 by Candie Hare RN  Fluid/Electrolyte Management: fluids provided       Shift Note 24 [id number]     40/0 28 yr old admit  [facility] [place] Louis pt. Elective IOL. Pit started @ 0903, @ 24 chiquis units @ 1706. VSS. L PIV running LR @ 125 ml/hr. SVE @ 1725 3/40/-2. AROM clears @ 1725. EFM cat 1, cont mod q2-4 min. Pt uncomfortable. Wants epidural. FOB Samson supportive at bedside. Baby boy Hao, breast, [facility], yes to baby meds. Continue towards .

## 2024-04-22 NOTE — PROGRESS NOTES
Discussed plan to start Pitocin with pt and spouse the verbalize understanding and agrees with the plan.

## 2024-04-22 NOTE — PROGRESS NOTES
Pt presented to [facility] for an elective IOL with her  Samson. Pt is [id number] 40.0 weeks. Denies any health issues or issues with this pregnancy. Admitted to room 1, monitors applied, vss, afebrile. Completed admission.    0805 Paged Dr. Louis.  0815 Called Dr. Louis on cell phone, informed of pt's arrival. She will place orders for admission and Pitocin.

## 2024-04-22 NOTE — H&P
Maternal Admission H&P  [facility]  Date of Admission: 2024  Date of Service: 2024    Name      Velma Wright         1995  MRN       [id number]  PCP        [facility]    ________________________________________________________________________    Assessment and Plan:  28 year old  at 40w0d.  Baby AGA. Anticipate .  Group B strep: neg  Elective Induction: patient postdates with her first, having false labor symptoms. Choosing induction after due date.   Donis of 6. Pitocin at 16 units. Continue current plan  Will AROM if able.   ________________________________________________________________________    Chief Complaint: induction of labor, indication maternal request.    HPI: Velma Wright is a 28 year old woman at 40w0d, based on an Estimated Date of Delivery: 2024. She presents for induction.  Patient went to postdates with her first, having false labor symptoms. Choosing induction after due date. No other questions or concerns.     Patient Active Problem List    Diagnosis Date Noted    Encounter for triage in pregnant patient 2021     Priority: Medium    Pregnancy 2021     Priority: Medium    UTI (urinary tract infection) 2013     Priority: Medium    Protein in urine 2013     Priority: Medium    Routine screening for STI (sexually transmitted infection) 2013     Priority: Medium    CARDIOVASCULAR SCREENING; LDL GOAL LESS THAN 160 2013     Priority: Medium      OB History    Para Term  AB Living   2 1 1 0 0 1   SAB IAB Ectopic Multiple Live Births   0 0 0 0 1      # Outcome Date GA Lbr Sam/2nd Weight Sex Type Anes PTL Lv   2 Current            1 Term 21 41w2d 11:28 / 01:34 3.84 kg (8 lb 7.5 oz) F Vag-Spont EPI  JEANETTE      Name: SCOTTY,FEMALE-VELMA      Apgar1: 8  Apgar5: 9     Review of Systems Negative except what is  noted in HPI.   Past Medical History:   Diagnosis Date    Contraception       Past Surgical History:   Procedure Laterality Date    DENTAL SURGERY  8/2013    [place] tooth extraction   Bactrim [sulfamethoxazole w-trimethoprim], Cephalexin, and Pcn [penicillins]  Family History   Problem Relation Age of Onset    Family History Negative Mother     Family History Negative Father     Diabetes Paternal Grandmother     Diabetes Paternal Grandfather     Family History Negative Brother     Family History Negative Brother     Hypothyroidism Maternal Aunt     Hypothyroidism Cousin      Social History     Socioeconomic History    Marital status:      Spouse name: Not on file    Number of children: Not on file    Years of education: Not on file    Highest education level: Not on file   Occupational History    Not on file   Tobacco Use    Smoking status: Former     Types: Cigarettes    Smokeless tobacco: Never    Tobacco comments:     once per week   Substance and Sexual Activity    Alcohol use: Not Currently     Comment: occasionally- once per week    Drug use: No    Sexual activity: Yes     Partners: Male     Birth control/protection: Pill     Comment: 100%   Other Topics Concern    Parent/sibling w/ CABG, MI or angioplasty before 65F 55M? Not Asked   Social History Narrative    Not on file     Social Determinants of Health     Financial Resource Strain: Not on file   Food Insecurity: Not on file   Transportation Needs: Not on file   Physical Activity: Not on file   Stress: Not on file   Social Connections: Not on file   Interpersonal Safety: Not on file   Housing Stability: Not on file     Prior to Admission Medication List  Medications Prior to Admission   Medication Sig Dispense Refill Last Dose    Prenatal Vit-Fe Fumarate-FA (PRENATAL MULTIVITAMIN W/IRON) 27-0.8 MG tablet Take 1 tablet by mouth daily         Allergies  Allergies   Allergen Reactions    Bactrim [Sulfamethoxazole W-Trimethoprim] Rash     fever     "Cephalexin Rash    Pcn [Penicillins]      Rash     Immunization History   Administered Date(s) Administered    COVID-19 MONOVALENT 12+ (Pfizer) 08/26/2021, 09/16/2021    HEPA 03/30/2010    HPV 08/14/2009, 03/30/2010    TDAP (Adacel,Boostrix) 02/15/2012      Physical Exam  Patient Vitals for the past 24 hrs:   BP Temp Temp src Pulse Resp SpO2 Height Weight   04/22/24 1300 128/63 -- -- 87 16 -- -- --   04/22/24 1200 112/56 99.1  F (37.3  C) Oral 83 16 -- -- --   04/22/24 1100 104/56 -- -- 85 16 -- -- --   04/22/24 1000 111/58 98.5  F (36.9  C) Oral 81 16 -- -- --   04/22/24 0906 116/61 -- -- 94 16 -- -- --   04/22/24 0754 -- -- -- -- -- -- 1.651 m (5' 5\") 97.5 kg (215 lb)   04/22/24 0751 120/60 98.2  F (36.8  C) Oral 93 18 96 % -- --     Wt Readings from Last 1 Encounters:   04/22/24 97.5 kg (215 lb)   Prepregnancy: Pregravid weight not on file, BMI Could not be calculated. Total gain: Not found. (expected gain: Could not be calculated).    HEART: RRR, no murmur  LUNGS: CTA bilaterally  Fetal Heart Tones: Baseline 140 bpm, variability moderate (6-25 bpm), no decelerations. Reactive.  CONTRACTIONS:  regular, every 2-3 minutes.  CERVIX: dilation 2 cm , 40% effaced, soft, and -2 station.  FLUID: None noted.  Fetal Presentation: vertex.  Labs  Admission on 04/22/2024   Component Date Value Ref Range Status    Treponema Palldum Antibody (Extern* 09/11/2023 Nonreactive  Nonreactive Final    Hepatitis B Surface Antigen (Exter* 09/11/2023 Nonreactive  Nonreactive Final    Group B Streptococcus (External) 03/25/2024 Negative  Negative Final    Rubella Antibody IgG (External) 09/11/2023 Immune  Nonreactive Final    Hemoglobin (External) 09/11/2023 12.9  11.7 - 15.5 g/dL Final    HIV 1&2 Antibody (External) 09/11/2023 Nonreactive  Nonreactive Final    ABO (External) 09/11/2023 O   Final    Rh (External) 09/11/2023 POSITIVE   Final    Treponema Palldum Antibody (Extern* 01/29/2024 Nonreactive  Nonreactive Final    Hemoglobin " "04/22/2024 11.2 (L)  11.7 - 15.7 g/dL Final    ABO/RH(D) 04/22/2024 O POS   Final    Antibody Screen 04/22/2024 Negative  Negative Final    SPECIMEN EXPIRATION DATE 04/22/2024 [id number]   Final    Group B Streptococcus (External) 03/25/2024 Negative  Negative Final    No results found for: \"GBPCRT\"   Antibody Screen   Date Value Ref Range Status   04/22/2024 Negative Negative Final     Chlamydia Trachomatis PCR   Date Value Ref Range Status   04/27/2016  NEG Final    Negative   Negative for C. trachomatis rRNA by transcription mediated amplification.   A negative result by transcription mediated amplification does not preclude the   presence of C. trachomatis infection because results are dependent on proper   and adequate collection, absence of inhibitors, and sufficient rRNA to be   detected.       N Gonorrhea PCR   Date Value Ref Range Status   04/27/2016  NEG Final    Negative   Negative for N. gonorrhoeae rRNA by transcription mediated amplification.   A negative result by transcription mediated amplification does not preclude the   presence of N. gonorrhoeae infection because results are dependent on proper   and adequate collection, absence of inhibitors, and sufficient rRNA to be   detected.       Hemoglobin   Date Value Ref Range Status   04/22/2024 11.2 (L) 11.7 - 15.7 g/dL Final        Completed by:   Ruth Louis DO IBCLC   [facility]  4/22/2024 1:08 PM   used: Not needed.   "

## 2024-04-23 LAB — HGB BLD-MCNC: 10.2 G/DL (ref 11.7–15.7)

## 2024-04-23 PROCEDURE — 250N000013 HC RX MED GY IP 250 OP 250 PS 637: Performed by: STUDENT IN AN ORGANIZED HEALTH CARE EDUCATION/TRAINING PROGRAM

## 2024-04-23 PROCEDURE — 36415 COLL VENOUS BLD VENIPUNCTURE: CPT | Performed by: STUDENT IN AN ORGANIZED HEALTH CARE EDUCATION/TRAINING PROGRAM

## 2024-04-23 PROCEDURE — 250N000009 HC RX 250: Performed by: STUDENT IN AN ORGANIZED HEALTH CARE EDUCATION/TRAINING PROGRAM

## 2024-04-23 PROCEDURE — 120N000001 HC R&B MED SURG/OB

## 2024-04-23 PROCEDURE — 85018 HEMOGLOBIN: CPT | Performed by: STUDENT IN AN ORGANIZED HEALTH CARE EDUCATION/TRAINING PROGRAM

## 2024-04-23 PROCEDURE — 258N000003 HC RX IP 258 OP 636: Performed by: ANESTHESIOLOGY

## 2024-04-23 PROCEDURE — 250N000011 HC RX IP 250 OP 636: Performed by: ANESTHESIOLOGY

## 2024-04-23 PROCEDURE — 250N000011 HC RX IP 250 OP 636: Performed by: STUDENT IN AN ORGANIZED HEALTH CARE EDUCATION/TRAINING PROGRAM

## 2024-04-23 PROCEDURE — 722N000001 HC LABOR CARE VAGINAL DELIVERY SINGLE

## 2024-04-23 RX ORDER — LOPERAMIDE HCL 2 MG
2 CAPSULE ORAL
Status: DISCONTINUED | OUTPATIENT
Start: 2024-04-23 | End: 2024-04-24 | Stop reason: HOSPADM

## 2024-04-23 RX ORDER — CALCIUM CARBONATE 500 MG/1
500 TABLET, CHEWABLE ORAL ONCE
Status: COMPLETED | OUTPATIENT
Start: 2024-04-23 | End: 2024-04-23

## 2024-04-23 RX ORDER — ACETAMINOPHEN 325 MG/1
650 TABLET ORAL EVERY 4 HOURS PRN
Status: DISCONTINUED | OUTPATIENT
Start: 2024-04-23 | End: 2024-04-24 | Stop reason: HOSPADM

## 2024-04-23 RX ORDER — METHYLERGONOVINE MALEATE 0.2 MG/ML
200 INJECTION INTRAVENOUS
Status: DISCONTINUED | OUTPATIENT
Start: 2024-04-23 | End: 2024-04-24 | Stop reason: HOSPADM

## 2024-04-23 RX ORDER — LOPERAMIDE HCL 2 MG
4 CAPSULE ORAL
Status: DISCONTINUED | OUTPATIENT
Start: 2024-04-23 | End: 2024-04-24 | Stop reason: HOSPADM

## 2024-04-23 RX ORDER — DOCUSATE SODIUM 100 MG/1
100 CAPSULE, LIQUID FILLED ORAL DAILY
Status: DISCONTINUED | OUTPATIENT
Start: 2024-04-23 | End: 2024-04-24 | Stop reason: HOSPADM

## 2024-04-23 RX ORDER — MODIFIED LANOLIN
OINTMENT (GRAM) TOPICAL
Status: DISCONTINUED | OUTPATIENT
Start: 2024-04-23 | End: 2024-04-24 | Stop reason: HOSPADM

## 2024-04-23 RX ORDER — BISACODYL 10 MG
10 SUPPOSITORY, RECTAL RECTAL DAILY PRN
Status: DISCONTINUED | OUTPATIENT
Start: 2024-04-23 | End: 2024-04-24 | Stop reason: HOSPADM

## 2024-04-23 RX ORDER — TRANEXAMIC ACID 10 MG/ML
1 INJECTION, SOLUTION INTRAVENOUS EVERY 30 MIN PRN
Status: DISCONTINUED | OUTPATIENT
Start: 2024-04-23 | End: 2024-04-24 | Stop reason: HOSPADM

## 2024-04-23 RX ORDER — OXYTOCIN/0.9 % SODIUM CHLORIDE 30/500 ML
340 PLASTIC BAG, INJECTION (ML) INTRAVENOUS CONTINUOUS PRN
Status: DISCONTINUED | OUTPATIENT
Start: 2024-04-23 | End: 2024-04-24 | Stop reason: HOSPADM

## 2024-04-23 RX ORDER — OXYTOCIN 10 [USP'U]/ML
10 INJECTION, SOLUTION INTRAMUSCULAR; INTRAVENOUS
Status: DISCONTINUED | OUTPATIENT
Start: 2024-04-23 | End: 2024-04-24 | Stop reason: HOSPADM

## 2024-04-23 RX ORDER — CARBOPROST TROMETHAMINE 250 UG/ML
250 INJECTION, SOLUTION INTRAMUSCULAR
Status: DISCONTINUED | OUTPATIENT
Start: 2024-04-23 | End: 2024-04-24 | Stop reason: HOSPADM

## 2024-04-23 RX ORDER — IBUPROFEN 800 MG/1
800 TABLET, FILM COATED ORAL EVERY 6 HOURS PRN
Status: DISCONTINUED | OUTPATIENT
Start: 2024-04-23 | End: 2024-04-24 | Stop reason: HOSPADM

## 2024-04-23 RX ORDER — MISOPROSTOL 200 UG/1
400 TABLET ORAL
Status: DISCONTINUED | OUTPATIENT
Start: 2024-04-23 | End: 2024-04-24 | Stop reason: HOSPADM

## 2024-04-23 RX ORDER — HYDROCORTISONE 25 MG/G
CREAM TOPICAL 3 TIMES DAILY PRN
Status: DISCONTINUED | OUTPATIENT
Start: 2024-04-23 | End: 2024-04-24 | Stop reason: HOSPADM

## 2024-04-23 RX ORDER — MISOPROSTOL 200 UG/1
800 TABLET ORAL
Status: DISCONTINUED | OUTPATIENT
Start: 2024-04-23 | End: 2024-04-24 | Stop reason: HOSPADM

## 2024-04-23 RX ADMIN — Medication 100 MCG: at 00:47

## 2024-04-23 RX ADMIN — IBUPROFEN 800 MG: 800 TABLET ORAL at 23:59

## 2024-04-23 RX ADMIN — DOCUSATE SODIUM 100 MG: 100 CAPSULE, LIQUID FILLED ORAL at 09:13

## 2024-04-23 RX ADMIN — ACETAMINOPHEN 650 MG: 325 TABLET ORAL at 11:28

## 2024-04-23 RX ADMIN — CALCIUM CARBONATE (ANTACID) CHEW TAB 500 MG 500 MG: 500 CHEW TAB at 01:27

## 2024-04-23 RX ADMIN — Medication: at 19:56

## 2024-04-23 RX ADMIN — ACETAMINOPHEN 650 MG: 325 TABLET ORAL at 23:59

## 2024-04-23 RX ADMIN — CALCIUM CARBONATE (ANTACID) CHEW TAB 500 MG 500 MG: 500 CHEW TAB at 03:00

## 2024-04-23 RX ADMIN — SODIUM CHLORIDE, POTASSIUM CHLORIDE, SODIUM LACTATE AND CALCIUM CHLORIDE 250 ML: 600; 310; 30; 20 INJECTION, SOLUTION INTRAVENOUS at 00:48

## 2024-04-23 RX ADMIN — TRANEXAMIC ACID 1 G: 10 INJECTION, SOLUTION INTRAVENOUS at 01:33

## 2024-04-23 RX ADMIN — IBUPROFEN 800 MG: 800 TABLET ORAL at 12:33

## 2024-04-23 RX ADMIN — KETOROLAC TROMETHAMINE 30 MG: 30 INJECTION, SOLUTION INTRAMUSCULAR; INTRAVENOUS at 06:09

## 2024-04-23 RX ADMIN — Medication 100 MCG: at 00:32

## 2024-04-23 RX ADMIN — EPSOM SALT: 1 GRANULE ORAL; TOPICAL at 14:51

## 2024-04-23 RX ADMIN — CALCIUM CARBONATE (ANTACID) CHEW TAB 500 MG 500 MG: 500 CHEW TAB at 09:00

## 2024-04-23 ASSESSMENT — ACTIVITIES OF DAILY LIVING (ADL)
ADLS_ACUITY_SCORE: 20

## 2024-04-23 NOTE — PLAN OF CARE
Problem: Adult Inpatient Plan of Care  Goal: Plan of Care Review  Description: The Plan of Care Review/Shift note should be completed every shift.  The Outcome Evaluation is a brief statement about your assessment that the patient is improving, declining, or no change.  This information will be displayed automatically on your shift  note.  Outcome: Progressing   Goal Outcome Evaluation:       Pt's vss and assessments stable following NVD.   Bleeding minima, fundus firma and midline.   Qbl 876 with initial qbl of 606. Pt asymptomatic.       Pt up and voided 400cc. Pt declined shower/tub.

## 2024-04-23 NOTE — PROGRESS NOTES
Dr. Louis updated regarding, pt receiving labor epidural, fetal tracing, contractions patterns and decreasing pitocin by half.   Plan to check SVE in an hour and update Heriberto gomez or sooner if needed.

## 2024-04-23 NOTE — PROGRESS NOTES
Dr. Louis called for updated. Updated on SVE, contraction patterns, pitocin and fetal tracing.  Plant to recheck  SVE 3-4 hours or sooner if needed.

## 2024-04-23 NOTE — ANESTHESIA PREPROCEDURE EVALUATION
Anesthesia Pre-Procedure Evaluation    Patient: Velma Wright   MRN: [id number] : 1995        Procedure :           Past Medical History:   Diagnosis Date    Contraception       Past Surgical History:   Procedure Laterality Date    DENTAL SURGERY  2013    [place] tooth extraction      Allergies   Allergen Reactions    Bactrim [Sulfamethoxazole W-Trimethoprim] Rash     fever    Cephalexin Rash    Pcn [Penicillins]      Rash      Social History     Tobacco Use    Smoking status: Former     Types: Cigarettes    Smokeless tobacco: Never    Tobacco comments:     once per week   Substance Use Topics    Alcohol use: Not Currently     Comment: occasionally- once per week      Wt Readings from Last 1 Encounters:   24 97.5 kg (215 lb)        Anesthesia Evaluation        No history of anesthetic complications       ROS/MED HX  ENT/Pulmonary:  - neg pulmonary ROS     Neurologic:  - neg neurologic ROS     Cardiovascular:  - neg cardiovascular ROS     METS/Exercise Tolerance:     Hematologic:  - neg hematologic  ROS     Musculoskeletal:       GI/Hepatic:       Renal/Genitourinary:       Endo:     (+)               Obesity,       Psychiatric/Substance Use:       Infectious Disease:       Malignancy:       Other:     (-) previous        Physical Exam    Airway             Respiratory Devices and Support         Dental  no notable dental history         Cardiovascular   cardiovascular exam normal          Pulmonary   pulmonary exam normal                OUTSIDE LABS:  CBC:   Lab Results   Component Value Date    WBC 23.5 (H) 2021    WBC 5.8 2016    HGB 11.2 (L) 2024    HGB 10.1 (L) 2021    HCT 39.1 2021    HCT 40.9 2016     2021     2016     BMP:   Lab Results   Component Value Date     2016     2013    POTASSIUM 4.2 2016    POTASSIUM 4.3 2013    CHLORIDE 106 2016    CHLORIDE 102 2013    CO2 24  "04/27/2016    CO2 25 04/04/2013    BUN 11 04/27/2016    BUN 11 04/04/2013    CR 0.75 04/27/2016    CR 0.70 04/04/2013    GLC 84 04/27/2016    GLC 84 04/04/2013     COAGS: No results found for: \"PTT\", \"INR\", \"FIBR\"  POC:   Lab Results   Component Value Date    HCG Negative 09/23/2013     HEPATIC:   Lab Results   Component Value Date    ALBUMIN 3.9 04/27/2016    PROTTOTAL 7.1 04/27/2016    ALT 15 04/27/2016    AST 16 04/27/2016    ALKPHOS 68 04/27/2016    BILITOTAL 0.4 04/27/2016     OTHER:   Lab Results   Component Value Date    TEODORO 9.0 04/27/2016    TSH 1.10 04/27/2016    T4 0.90 04/27/2016       Anesthesia Plan    ASA Status:  3       Anesthesia Type: Epidural.              Consents    Anesthesia Plan(s) and associated risks, benefits, and realistic alternatives discussed. Questions answered and patient/representative(s) expressed understanding.     - Discussed:     - Discussed with:  Patient, Spouse            Postoperative Care            Comments:    Other Comments: Patient requests labor epidural. Chart reviewed, including labs. Patient interviewed and examined at bedside with RN present throughout. Discussed the procedure of epidural placement, expectations, and risks including but not limited to: bleeding, infection, damage to tissues under the skin (nerves, muscles, blood vessels), hypotension, headache, and epidural failure. All questions were answered. The patient consents to proceed with elective labor epidural placement                 Chinmay Hu MD    I have reviewed the pertinent notes and labs in the chart from the past 30 days and (re)examined the patient.  Any updates or changes from those notes are reflected in this note.                  "

## 2024-04-23 NOTE — PROGRESS NOTES
Labor Progress Note  [facility]  Date of Admission: 2024  Date of Service: 2024    Assessment/Plan  28 year old  at 40w0d gestational age admitted for postdates with her first, having false labor symptoms. Choosing induction after due date.    - Continue to monitor FHR  - Anticipate   - Currently on 8 units of pitocin. Feeling contractions  - S/P AROM with clear fluid at 1730 2024  - Patient complete and can start pushing     Subjective  Comfortable with epidural. Feeling pressure with contractions.     Objective  Vital signs in last 24 hours  Temp:  [97.9  F (36.6  C)-99.1  F (37.3  C)] 98  F (36.7  C)  Pulse:  [81-97] 86  Resp:  [16-20] 20  BP: ()/(50-73) 105/55  SpO2:  [94 %-100 %] 98 %      Physical Exam  General:   Abd: Gravid, soft, nontender  Cervix: 10cm, 100% effaced, -1 station  FHR: Baseline 140/mod variability/yes accels/no decels; Category 1 tracing  [place]: Contractions Q 3-4 min  Extremities: no peripheral edema      Ruth Louis DO IBCLC  [facility]

## 2024-04-23 NOTE — ANESTHESIA PROCEDURE NOTES
Epidural catheter Procedure Note    Pre-Procedure   Staff -        Anesthesiologist:  Chinmay Hu MD       Performed By: anesthesiologist       Location: OB       Procedure Start/Stop Times: 4/22/2024 7:20 PM and 4/22/2024 7:42 PM       Pre-Anesthestic Checklist: patient identified, IV checked, risks and benefits discussed, informed consent, monitors and equipment checked and pre-op evaluation  Timeout:       Correct Patient: Yes        Correct Procedure: Yes        Correct Site: Yes        Correct Position: Yes   Procedure Documentation  Procedure: epidural catheter       Diagnosis: Labor       Patient Position: sitting       Patient Prep/Sterile Barriers: sterile gloves, mask, patient draped       Skin prep: Chloraprep       Local skin infiltrated with 5 mL of 1% lidocaine.        Insertion Site: L3-4. (midline approach).       Technique: LORT saline        ANJELICA at 7 cm.       Needle Type: [employer]       Needle Gauge: 18.        Needle Length (Inches): 3.5        Catheter: 20 G.          Catheter threaded easily.           Threaded 12 cm at skin.         # of attempts: 2 and  # of redirects:     Assessment/Narrative         Paresthesias: No.       Test dose of 5 mL lidocaine 1.5% w/ 1:200,000 epinephrine at 19:37 CDT.         Test dose negative, 3 minutes after injection, for signs of intravascular, subdural, or intrathecal injection.       Insertion/Infusion Method: LORT saline       Aspiration negative for Heme or CSF via Epidural Catheter.    Medication(s) Administered   0.25% Bupivacaine PF (Epidural) - EPIDURAL   5 mL - 4/22/2024 7:40:00 PM  Medication Administration Time: 4/22/2024 7:20 PM     Comments:  After discussion of risks and benefits of a labor epidural including risk of bleeding, hypotension, failed epidural, infection, and headache. The patient agreed to placement of an epidural. Hands washed. The skin over the lower back was prepped and draped using sterile technique with sterile prep,  "drape, gloves, and hat. Sterile prep was allowed to dry for 3 minutes. Lidocaine was used subcutaneously to provide analgesia during placement. Patient noted discomfort during placement so additional lidocaine was obtained to ensure analgesia. Loss of resistance technique was used with saline. The epidural catheter was threaded easily. Negative aspiration of heme/CSF from epidural catheter. Negative test dose of Lidocaine 1.5% with epi. Epidural catheter secured using sterile tegaderm and tape. L&D RN was present the entire procedure.           FOR [facility] ([facility]) ONLY:   Pain Team Contact information: please page the Pain Team Via [employer]. Search \"Pain\". During daytime hours, please page the attending first. At night please page the resident first.      "

## 2024-04-23 NOTE — PLAN OF CARE
Patient reporting pain 4/10 tylenol and motrin given, VSS, voiding, ambulating, tolerating diet, minimal bleeding, breastfeeding independently. Hgb recheck in RAFA Bright RN on 4/23/2024 at 2:34 PM

## 2024-04-23 NOTE — ANESTHESIA POSTPROCEDURE EVALUATION
Patient: Velma Wright    Procedure: * No procedures listed *       Anesthesia Type:  Epidural    Note:  Disposition: Inpatient   Postop Pain Control: Uneventful            Sign Out: Well controlled pain   PONV: No   Neuro/Psych: Uneventful            Sign Out: Acceptable/Baseline neuro status   Airway/Respiratory: Uneventful            Sign Out: Acceptable/Baseline resp. status   CV/Hemodynamics: Uneventful            Sign Out: Acceptable CV status; No obvious hypovolemia; No obvious fluid overload   Other NRE: NONE   DID A NON-ROUTINE EVENT OCCUR? No           Last vitals:  Vitals:    04/23/24 0540 04/23/24 0831 04/23/24 1230   BP: 120/56 113/55 116/69   Pulse:  87 75   Resp:  18 18   Temp:  37.1  C (98.8  F) 37.2  C (99  F)   SpO2: 96% 95% 96%       Electronically Signed By: Elias Knox MD  April 23, 2024  3:49 PM

## 2024-04-24 VITALS
TEMPERATURE: 98.2 F | BODY MASS INDEX: 34.41 KG/M2 | WEIGHT: 206.56 LBS | HEIGHT: 65 IN | SYSTOLIC BLOOD PRESSURE: 117 MMHG | HEART RATE: 75 BPM | DIASTOLIC BLOOD PRESSURE: 62 MMHG | OXYGEN SATURATION: 98 % | RESPIRATION RATE: 16 BRPM

## 2024-04-24 PROCEDURE — 250N000013 HC RX MED GY IP 250 OP 250 PS 637: Performed by: STUDENT IN AN ORGANIZED HEALTH CARE EDUCATION/TRAINING PROGRAM

## 2024-04-24 RX ORDER — IBUPROFEN 200 MG
600 TABLET ORAL EVERY 8 HOURS PRN
Status: SHIPPED
Start: 2024-04-24

## 2024-04-24 RX ORDER — ACETAMINOPHEN 500 MG
500-1000 TABLET ORAL EVERY 8 HOURS PRN
Status: SHIPPED
Start: 2024-04-24

## 2024-04-24 RX ADMIN — DOCUSATE SODIUM 100 MG: 100 CAPSULE, LIQUID FILLED ORAL at 07:55

## 2024-04-24 RX ADMIN — ACETAMINOPHEN 650 MG: 325 TABLET ORAL at 07:54

## 2024-04-24 RX ADMIN — IBUPROFEN 800 MG: 800 TABLET ORAL at 07:55

## 2024-04-24 ASSESSMENT — ACTIVITIES OF DAILY LIVING (ADL)
ADLS_ACUITY_SCORE: 20

## 2024-04-24 NOTE — DISCHARGE INSTRUCTIONS
Warning Signs after Having a Baby    Keep this paper on your fridge or somewhere else where you can see it.    Call your provider if you have any of these symptoms up to 12 weeks after having your baby.    Thoughts of hurting yourself or your baby  Pain in your chest or trouble breathing  Severe headache not helped by pain medicine  Eyesight concerns (blurry vision, seeing spots or flashes of light, other changes to eyesight)  Fainting, shaking or other signs of a seizure    Call 9-1-1 if you feel that it is an emergency.     The symptoms below can happen to anyone after giving birth. They can be very serious. Call your provider if you have any of these warning signs.    My provider s phone number: _______________________    Losing too much blood (hemorrhage)    Call your provider if you soak through a pad in less than an hour or pass blood clots bigger than a golf ball. These may be signs that you are bleeding too much.    Blood clots in the legs or lungs    After you give birth, your body naturally clots its blood to help prevent blood loss. Sometimes this increased clotting can happen in other areas of the body, like the legs or lungs. This can block your blood flow and be very dangerous.     Call your provider if you:  Have a red, swollen spot on the back of your leg that is warm or painful when you touch it.   Are coughing up blood.     Infection    Call your provider if you have any of these symptoms:  Fever of 100.4 F (38 C) or higher.  Pain or redness around your stitches if you had an incision.   Any yellow, white, or green fluid coming from places where you had stitches or surgery.    Mood Problems (postpartum depression)    Many people feel sad or have mood changes after having a baby. But for some people, these mood swings are worse.     Call your provider right away if you feel so anxious or nervous that you can't care for yourself or your baby.    Preeclampsia (high blood pressure)    Even if you  didn't have high blood pressure when you were pregnant, you are at risk for the high blood pressure disease called preeclampsia. This risk can last up to 12 weeks after giving birth.     Call your provider if you have:   Pain on your right side under your rib cage  Sudden swelling in the hands and face    Remember: You know your body. If something doesn't feel right, get medical help.     For informational purposes only. Not to replace the advice of your health care provider. Copyright 2020 [facility]. All rights reserved. Clinically reviewed by Heydi Avalos, RNC-OB, MSN. [employer] [id number] - Rev 02/23.

## 2024-04-24 NOTE — PLAN OF CARE
Discharge instructions reviewed including warning signs, home medication, and next appointments. All questions answered. Home with spouse and baby.    Tamiko Bright RN on 4/24/2024 at 10:35 AM

## 2024-04-24 NOTE — LACTATION NOTE
This note was copied from a baby's chart.  Lactation Visit:      Hours since Delivery: 1 day 7 hours old.    Gestational Age at Delivery: 40.1 weeks.    Visit with Lactation: Mother is a multip who delivered yesterday AM; she states she breast fed her previous child for 3 months, and then infant started to like the bottle so mother exclusively pumped after that, mother states her previous child and her were  d/t a NICU stay, and she felt she had a low milk supply d/t that separation and the lack of pumping when infant was feeding in NICU. In the last 24 hours, infant has been to breast 10 times, has voided x2, soiled x4, and had a weight loss of 3.95% (since delivery). Upon entering room, mother had Hao on left breast in cradle hold; mother reports latch is comfortable. Hao in rhythmic pattern, with many audible swallows noted. Discussed paced bottle feeding, engorgement, and pumping if she feels she needs to increase milk supply for Hao, however, her and Hao haven't been , and he has been feeding well at breast. Anticipatory guidance given on input/output goals, normal feeding volumes in the  period, and pumping. Encouraged mother to let primary RN know if she would like lactation to return for feeding assistance or if questions arise. Mother aware of lactation resources available to her after discharging from hospital.     Plan: Continue breastfeeding on-demand and/or every 2-3 hours. Track feedings and diaper output.     Has Breast Pump for Home: Yes, [employer].    Education given: Stages of milk production after delivery, Listening & watching for swallows, Paced bottle feeding, Importance of tracking all feedings and diaper output, Engorgement, Reverse pressure softening, Pumping for missed feedings at breast, Cluster feeding,  waking techniques, Breast pump use for home, and History of low milk supply.

## 2024-04-24 NOTE — PROGRESS NOTES
"    Maternal Discharge Summary  [facility]  Date of Service: 2024    Name      Velma Wright         1995  MRN       [id number]  PCP        Dr. Louis, [facility] [place] ________________________________________________________________________    Assessment:  Velma Wright is a 28 year old now  s/p vaginal, spontaneous  at 40w1d on 2024.    Discharge Plan:   Discharge to Home. Condition at Discharge:  stable  Physical activity: Regular.  Diet:  Regular.  Home care nurse: ordered for 1-2 days from now\".  Lactation clinic appointment: not ordered due to Covid-19.  Follow up with Dr. Louis, [facility] in 6 weeks.  No future appointments.   ________________________________________________________________________    Admission Date:  2024  Discharge Date:  2024  Delivery Date:  2024  Gestational Age at Delivery: 40w1d    Principal Diagnosis: Labor and delivery  Delivery type: Vaginal, Spontaneous   Active Problems:    Encounter for triage in pregnant patient    Pregnancy     (normal spontaneous vaginal delivery)      Subjective:  Patient denies SOB, CP, lightheadedness, increased bleeding.      Discharge Exam:  Patient Vitals for the past 24 hrs:   BP Temp Temp src Pulse Resp SpO2   24 0335 107/71 98.6  F (37  C) Oral 75 16 96 %   24 0007 123/65 97.7  F (36.5  C) Oral 69 16 97 %   24 120/59 98.5  F (36.9  C) Oral 85 18 96 %   24 1554 115/73 98.6  F (37  C) Oral -- 16 96 %   24 1230 116/69 99  F (37.2  C) Oral 75 18 96 %   24 0831 113/55 98.8  F (37.1  C) Oral 87 18 95 %     General - alert, comfortable  Heart - RRR, no murmurs  Lungs - CTA bilaterally  Abdomen - fundus firm, nontender, below umbilicus  Extremities - trace edema  Admission on 2024   Component Date Value Ref Range Status    Treponema Palldum Antibody (Extern* 2023 " Nonreactive  Nonreactive Final    Hepatitis B Surface Antigen (Exter* 09/11/2023 Nonreactive  Nonreactive Final    Group B Streptococcus (External) 03/25/2024 Negative  Negative Final    Rubella Antibody IgG (External) 09/11/2023 Immune  Nonreactive Final    Hemoglobin (External) 09/11/2023 12.9  11.7 - 15.5 g/dL Final    HIV 1&2 Antibody (External) 09/11/2023 Nonreactive  Nonreactive Final    ABO (External) 09/11/2023 O   Final    Rh (External) 09/11/2023 POSITIVE   Final    Treponema Pal[place]um Antibody (Extern* 01/29/2024 Nonreactive  Nonreactive Final    Hemoglobin 04/22/2024 11.2 (L)  11.7 - 15.7 g/dL Final    Treponema Antibody Total 04/22/2024 Nonreactive  Nonreactive Final    ABO/RH(D) 04/22/2024 O POS   Final    Antibody Screen 04/22/2024 Negative  Negative Final    SPECIMEN EXPIRATION DATE 04/22/2024 [id number]   Final    Group B Streptococcus (External) 03/25/2024 Negative  Negative Final    Hemoglobin 04/23/2024 10.2 (L)  11.7 - 15.7 g/dL Final      Discharge Medications: See medication reconciliation.     Completed by:   [facility] DO [facility] [place]  4/24/2024 7:21 AM   used: Not needed.

## 2024-04-24 NOTE — PLAN OF CARE
Data: Vital signs within normal limits. Postpartum checks within normal limits - see flow record. Patient eating and drinking normally. Patient able to empty bladder independently and is up ambulating. No apparent signs of infection. Patient performing self cares and is able to care for infant.    Action: Patient medicated during the shift for cramping. See MAR. Patient reassessed within 1 hour after each medication and pain was improved - patient stated she was comfortable. Patient education done about self-care. See flow record.    Response: Positive attachment behaviors observed with infant. Support persons  present.     Plan: Anticipate discharge on 04/24/2024.    Goal Outcome Evaluation:      Plan of Care Reviewed With: patient    Overall Patient Progress: improvingOverall Patient Progress: improving

## 2024-04-24 NOTE — PROGRESS NOTES
VSS this shift. Pain is managed with PRN tylenol and ibuprofen. Ambulating independently and voiding without reported difficulty. Breastfeeding infant independently. Anticipating discharge today.

## 2024-07-07 ENCOUNTER — HEALTH MAINTENANCE LETTER (OUTPATIENT)
Age: 29
End: 2024-07-07

## 2025-06-03 NOTE — PROVIDER NOTIFICATION
Pt notified as such and verbalizes understanding.   Velma arrived ambulatory to [facility] for induction.  Pt states her baby is moving as usual.  Pt denies contractions or leaking fluid.  She does report scant spotting after her membranes were stripped in clinic.  JASVIR Florez called and updated on patient arrival.  Per CNM, no SVE is needed at this time. INDRAM plans to place orders for cervidil tonight.

## 2025-07-13 ENCOUNTER — HEALTH MAINTENANCE LETTER (OUTPATIENT)
Age: 30
End: 2025-07-13